# Patient Record
Sex: FEMALE
[De-identification: names, ages, dates, MRNs, and addresses within clinical notes are randomized per-mention and may not be internally consistent; named-entity substitution may affect disease eponyms.]

---

## 2021-07-12 PROBLEM — Z00.00 ENCOUNTER FOR PREVENTIVE HEALTH EXAMINATION: Status: ACTIVE | Noted: 2021-07-12

## 2021-07-17 ENCOUNTER — APPOINTMENT (OUTPATIENT)
Dept: CARDIOLOGY | Facility: CLINIC | Age: 56
End: 2021-07-17
Payer: COMMERCIAL

## 2021-07-17 VITALS
RESPIRATION RATE: 16 BRPM | WEIGHT: 162 LBS | DIASTOLIC BLOOD PRESSURE: 71 MMHG | SYSTOLIC BLOOD PRESSURE: 122 MMHG | HEART RATE: 65 BPM | BODY MASS INDEX: 27.66 KG/M2 | HEIGHT: 64 IN

## 2021-07-17 DIAGNOSIS — Z86.79 PERSONAL HISTORY OF OTHER DISEASES OF THE CIRCULATORY SYSTEM: ICD-10-CM

## 2021-07-17 DIAGNOSIS — Z82.49 FAMILY HISTORY OF ISCHEMIC HEART DISEASE AND OTHER DISEASES OF THE CIRCULATORY SYSTEM: ICD-10-CM

## 2021-07-17 DIAGNOSIS — Z86.39 PERSONAL HISTORY OF OTHER ENDOCRINE, NUTRITIONAL AND METABOLIC DISEASE: ICD-10-CM

## 2021-07-17 DIAGNOSIS — Z72.0 TOBACCO USE: ICD-10-CM

## 2021-07-17 DIAGNOSIS — Z82.3 FAMILY HISTORY OF STROKE: ICD-10-CM

## 2021-07-17 DIAGNOSIS — Z72.89 OTHER PROBLEMS RELATED TO LIFESTYLE: ICD-10-CM

## 2021-07-17 PROCEDURE — 93000 ELECTROCARDIOGRAM COMPLETE: CPT

## 2021-07-17 PROCEDURE — 99072 ADDL SUPL MATRL&STAF TM PHE: CPT

## 2021-07-17 PROCEDURE — 99204 OFFICE O/P NEW MOD 45 MIN: CPT

## 2021-07-23 ENCOUNTER — APPOINTMENT (OUTPATIENT)
Dept: CARDIOLOGY | Facility: CLINIC | Age: 56
End: 2021-07-23
Payer: COMMERCIAL

## 2021-07-23 PROCEDURE — 93306 TTE W/DOPPLER COMPLETE: CPT

## 2021-07-30 ENCOUNTER — APPOINTMENT (OUTPATIENT)
Dept: CARDIOLOGY | Facility: CLINIC | Age: 56
End: 2021-07-30
Payer: COMMERCIAL

## 2021-07-30 PROCEDURE — 93015 CV STRESS TEST SUPVJ I&R: CPT

## 2021-08-25 ENCOUNTER — APPOINTMENT (OUTPATIENT)
Dept: CARDIOLOGY | Facility: CLINIC | Age: 56
End: 2021-08-25
Payer: COMMERCIAL

## 2021-08-25 VITALS
HEIGHT: 64 IN | WEIGHT: 162 LBS | HEART RATE: 75 BPM | DIASTOLIC BLOOD PRESSURE: 70 MMHG | SYSTOLIC BLOOD PRESSURE: 124 MMHG | BODY MASS INDEX: 27.66 KG/M2 | RESPIRATION RATE: 16 BRPM

## 2021-08-25 PROCEDURE — 99214 OFFICE O/P EST MOD 30 MIN: CPT

## 2021-08-25 PROCEDURE — 93000 ELECTROCARDIOGRAM COMPLETE: CPT

## 2021-08-25 RX ORDER — LIOTHYRONINE SODIUM 5 UG/1
5 TABLET ORAL
Refills: 0 | Status: ACTIVE | COMMUNITY

## 2021-08-25 RX ORDER — LEVOTHYROXINE SODIUM 88 UG/1
88 TABLET ORAL DAILY
Refills: 0 | Status: ACTIVE | COMMUNITY

## 2021-08-25 NOTE — PHYSICAL EXAM
[Clear Lung Fields] : clear lung fields [Normal Bowel Sounds] : normal bowel sounds [No Edema] : no edema [No Rash] : no rash [Normal] : moves all extremities, no focal deficits, normal speech

## 2021-08-27 NOTE — REASON FOR VISIT
[FreeTextEntry1] : Mrs. Razo is a pleasant 55-year-old white female with a past medical history significant for rheumatic fever as a child as well as the serendipitous finding of a pericardial cyst while undergoing a CAT scan for an abdominal issue, who recently developed palpitations and presents for a follow up evaluation. \par \par

## 2021-08-27 NOTE — HISTORY OF PRESENT ILLNESS
[FreeTextEntry1] : Mrs. Razo continues to experience intermittent palpitations which last only a few seconds at a time and do not appear to be exertionally related, nor associated with chest pain, shortness of breath, lightheadedness, or syncope.  She underwent a non-invasive evaluation which included 30-day ambulatory event monitoring echocardiography as well as an exercise stress test.

## 2021-08-27 NOTE — ASSESSMENT
[FreeTextEntry1] : 1.  EKG today demonstrates normal sinus rhythm at 75 bpm.  Normal intervals.  No evidence of ischemia.  \par \par 2.  30-day ambulatory event monitoring which was worn for approximately 19 days revealed several short bursts of paroxysmal supraventricular tachycardia ranging between 190 and 208 bpm.  I suspect that this is what the patient has been feeling lately and will require further evaluation by way of an EP consultation and a probable EP study.  The patient states that she has no time right now to undergo a formal EP evaluation as her daughter is getting  in a few weeks.  I have advised her to begin Metoprolol Succinate 25 mg daily and avoid all caffeinated beverages.  She is to remain well-hydrated and avoid any unnecessary exertion until she can be evaluated in the near future.  \par \par 3.  Her echocardiogram revealed normal left ventricular chamber dimensions and wall motion with preserved ejection fraction estimated at 60-65%.  The left atrium and right-sided chambers revealed normal dimensions and function.  The aortic root revealed a normal diameter.  Trace mitral valvular regurgitation without evidence of associated prolapse was noted.  No other significant findings. \par \par 4.  Exercise stress testing was performed utilizing a Eduard protocol.  The patient exercised for approximately six minutes and obtained 91% of her predicted maximal heart rate.  At a peak heart rate of 148 bpm, there were no significant EKG changes noted to suggest ischemia.  The patient did not experience chest pain or significant arrhythmia with exercise. \par \par 5.  The above-noted evaluation demonstrates the presence of paroxysmal supraventricular tachycardia.  The patient’s heart is structurally sound and there is no evidence of coronary insufficiency.  She will follow up with EP in the near future.   \par

## 2021-09-30 ENCOUNTER — NON-APPOINTMENT (OUTPATIENT)
Age: 56
End: 2021-09-30

## 2021-09-30 ENCOUNTER — APPOINTMENT (OUTPATIENT)
Dept: ELECTROPHYSIOLOGY | Facility: CLINIC | Age: 56
End: 2021-09-30
Payer: COMMERCIAL

## 2021-09-30 VITALS
SYSTOLIC BLOOD PRESSURE: 138 MMHG | BODY MASS INDEX: 27.49 KG/M2 | WEIGHT: 161 LBS | DIASTOLIC BLOOD PRESSURE: 80 MMHG | HEART RATE: 62 BPM | HEIGHT: 64 IN | RESPIRATION RATE: 11 BRPM

## 2021-09-30 PROCEDURE — 99203 OFFICE O/P NEW LOW 30 MIN: CPT

## 2021-09-30 PROCEDURE — 93000 ELECTROCARDIOGRAM COMPLETE: CPT

## 2021-10-12 NOTE — DISCUSSION/SUMMARY
[FreeTextEntry1] : In summary, Ms. Holloway is a 56 year old female with symptomatic SVT likely atrial tachycardia (? pulmonary vein origin). Following initiation of medical therapy (metoprolol) she reports an improvement in her symptoms. I discussed the various therapeutic options in detail with the patient including continued medical therapy vs EP study/ablation. The patient wishes to continue medical therapy for now. If symptoms recur or worsen will proceed with EP study/ablation. \par

## 2021-10-12 NOTE — HISTORY OF PRESENT ILLNESS
[FreeTextEntry1] : Ms. Milady Holloway is a 56 year old female referred for arrhythmia management. She has a history of palpitations, rheumatic fever, and pericardial cyst (incidental finding on CT scan). \par \par She has been experiencing palpitations for the last few months. The symptoms usually last less than a minute. Sometimes she also experiences dizziness. She wore a Holter monitor which shows short runs of SVT, likely atrial tachycardia. She was started on metoprolol 25 mg daily. The patient states that her symptoms have improved since starting this medication. LVEF was normal per echo. \par \par

## 2021-10-12 NOTE — CARDIOLOGY SUMMARY
[de-identified] : 9/30/21: Normal sinus rhythm at 62 bpm. Normal intervals.  [de-identified] : 7/30/21: Negative exercise stress test for ischemia [de-identified] : 7/23/21: EF 60-65%, no significant valvular disease

## 2021-12-13 ENCOUNTER — RESULT CHARGE (OUTPATIENT)
Age: 56
End: 2021-12-13

## 2021-12-14 ENCOUNTER — APPOINTMENT (OUTPATIENT)
Dept: CARDIOLOGY | Facility: CLINIC | Age: 56
End: 2021-12-14
Payer: COMMERCIAL

## 2021-12-14 VITALS
SYSTOLIC BLOOD PRESSURE: 120 MMHG | WEIGHT: 163 LBS | DIASTOLIC BLOOD PRESSURE: 82 MMHG | HEART RATE: 84 BPM | RESPIRATION RATE: 14 BRPM | HEIGHT: 64 IN | BODY MASS INDEX: 27.83 KG/M2

## 2021-12-14 PROCEDURE — 93000 ELECTROCARDIOGRAM COMPLETE: CPT

## 2021-12-14 PROCEDURE — 99214 OFFICE O/P EST MOD 30 MIN: CPT

## 2021-12-19 NOTE — PHYSICAL EXAM
[Clear Lung Fields] : clear lung fields [Normal Bowel Sounds] : normal bowel sounds [No Edema] : no edema [No Rash] : no rash [Normal] : alert and oriented, normal memory

## 2021-12-22 NOTE — ASSESSMENT
[FreeTextEntry1] : 1.  EKG today reveals normal sinus rhythm at 84 bpm.  Normal intervals.  Low voltage.  No ischemic changes. \par \par 2.  Palpitations:  Clinically stable at this time on current medications.  I have advised her to continue beta blocker therapy and exercise as much as possible.  \par \par 3.  Review of recent bloodwork reveals a total cholesterol of 210, HDL 62, TC/HDL ratio 3.4, , triglycerides 55.  A low-fat / low-cholesterol diet is advised.  Patient will undergo follow up bloodwork in the next 3-6 months.  \par

## 2021-12-22 NOTE — HISTORY OF PRESENT ILLNESS
[FreeTextEntry1] :  From a cardiac standpoint, Mr. Razo admits to occasional palpitations which are not associated with exertion, chest pain, shortness of breath, lightheadedness, or syncope.  She was recently evaluated by EP who feels that her palpitations are likely secondary to atrial tachycardia which have clearly improved with the implementation of Metoprolol therapy.

## 2021-12-22 NOTE — REASON FOR VISIT
[FreeTextEntry1] : Mrs. Razo is a pleasant 56-year-old white female with a past medical history significant for rheumatic fever as a child as well as the presence of a pericardial cyst noted on CAT scan, and palpitations who presents for follow up evaluation. \par

## 2022-05-12 ENCOUNTER — INPATIENT (INPATIENT)
Facility: HOSPITAL | Age: 57
LOS: 1 days | Discharge: HOME | DRG: 809 | End: 2022-05-14
Attending: INTERNAL MEDICINE | Admitting: INTERNAL MEDICINE
Payer: COMMERCIAL

## 2022-05-12 VITALS — WEIGHT: 160.06 LBS

## 2022-05-12 DIAGNOSIS — D70.9 NEUTROPENIA, UNSPECIFIED: ICD-10-CM

## 2022-05-12 LAB
ALBUMIN SERPL ELPH-MCNC: 3.3 G/DL — SIGNIFICANT CHANGE UP (ref 3.3–5)
ALP SERPL-CCNC: 78 U/L — SIGNIFICANT CHANGE UP (ref 40–120)
ALT FLD-CCNC: 37 U/L — SIGNIFICANT CHANGE UP (ref 12–78)
ANION GAP SERPL CALC-SCNC: 6 MMOL/L — SIGNIFICANT CHANGE UP (ref 5–17)
APTT BLD: 27.1 SEC — LOW (ref 27.5–35.5)
AST SERPL-CCNC: 24 U/L — SIGNIFICANT CHANGE UP (ref 15–37)
BASOPHILS # BLD AUTO: 0 K/UL — SIGNIFICANT CHANGE UP (ref 0–0.2)
BASOPHILS NFR BLD AUTO: 0 % — SIGNIFICANT CHANGE UP (ref 0–2)
BILIRUB SERPL-MCNC: 0.3 MG/DL — SIGNIFICANT CHANGE UP (ref 0.2–1.2)
BUN SERPL-MCNC: 15 MG/DL — SIGNIFICANT CHANGE UP (ref 7–23)
CALCIUM SERPL-MCNC: 9.4 MG/DL — SIGNIFICANT CHANGE UP (ref 8.5–10.1)
CHLORIDE SERPL-SCNC: 104 MMOL/L — SIGNIFICANT CHANGE UP (ref 96–108)
CO2 SERPL-SCNC: 28 MMOL/L — SIGNIFICANT CHANGE UP (ref 22–31)
CREAT SERPL-MCNC: 0.92 MG/DL — SIGNIFICANT CHANGE UP (ref 0.5–1.3)
EGFR: 72 ML/MIN/1.73M2 — SIGNIFICANT CHANGE UP
EOSINOPHIL # BLD AUTO: 0 K/UL — SIGNIFICANT CHANGE UP (ref 0–0.5)
EOSINOPHIL NFR BLD AUTO: 0 % — SIGNIFICANT CHANGE UP (ref 0–6)
GLUCOSE SERPL-MCNC: 115 MG/DL — HIGH (ref 70–99)
HCT VFR BLD CALC: 31.6 % — LOW (ref 34.5–45)
HGB BLD-MCNC: 11 G/DL — LOW (ref 11.5–15.5)
INR BLD: 1.07 RATIO — SIGNIFICANT CHANGE UP (ref 0.88–1.16)
LACTATE SERPL-SCNC: 0.8 MMOL/L — SIGNIFICANT CHANGE UP (ref 0.7–2)
LYMPHOCYTES # BLD AUTO: 1.26 K/UL — SIGNIFICANT CHANGE UP (ref 1–3.3)
LYMPHOCYTES # BLD AUTO: 69 % — HIGH (ref 13–44)
MCHC RBC-ENTMCNC: 29.2 PG — SIGNIFICANT CHANGE UP (ref 27–34)
MCHC RBC-ENTMCNC: 34.8 GM/DL — SIGNIFICANT CHANGE UP (ref 32–36)
MCV RBC AUTO: 83.8 FL — SIGNIFICANT CHANGE UP (ref 80–100)
MONOCYTES # BLD AUTO: 0.29 K/UL — SIGNIFICANT CHANGE UP (ref 0–0.9)
MONOCYTES NFR BLD AUTO: 16 % — HIGH (ref 2–14)
NEUTROPHILS # BLD AUTO: 0.22 K/UL — LOW (ref 1.8–7.4)
NEUTROPHILS NFR BLD AUTO: 10 % — LOW (ref 43–77)
NRBC # BLD: SIGNIFICANT CHANGE UP /100 WBCS (ref 0–0)
PLATELET # BLD AUTO: 279 K/UL — SIGNIFICANT CHANGE UP (ref 150–400)
POTASSIUM SERPL-MCNC: 3.2 MMOL/L — LOW (ref 3.5–5.3)
POTASSIUM SERPL-SCNC: 3.2 MMOL/L — LOW (ref 3.5–5.3)
PROT SERPL-MCNC: 7 GM/DL — SIGNIFICANT CHANGE UP (ref 6–8.3)
PROTHROM AB SERPL-ACNC: 12.4 SEC — SIGNIFICANT CHANGE UP (ref 10.5–13.4)
RBC # BLD: 3.77 M/UL — LOW (ref 3.8–5.2)
RBC # FLD: 12 % — SIGNIFICANT CHANGE UP (ref 10.3–14.5)
SODIUM SERPL-SCNC: 138 MMOL/L — SIGNIFICANT CHANGE UP (ref 135–145)
WBC # BLD: 1.82 K/UL — LOW (ref 3.8–10.5)
WBC # FLD AUTO: 1.82 K/UL — LOW (ref 3.8–10.5)

## 2022-05-12 PROCEDURE — 85025 COMPLETE CBC W/AUTO DIFF WBC: CPT

## 2022-05-12 PROCEDURE — 87040 BLOOD CULTURE FOR BACTERIA: CPT

## 2022-05-12 PROCEDURE — 83036 HEMOGLOBIN GLYCOSYLATED A1C: CPT

## 2022-05-12 PROCEDURE — 85730 THROMBOPLASTIN TIME PARTIAL: CPT

## 2022-05-12 PROCEDURE — 85610 PROTHROMBIN TIME: CPT

## 2022-05-12 PROCEDURE — 99285 EMERGENCY DEPT VISIT HI MDM: CPT

## 2022-05-12 PROCEDURE — 0225U NFCT DS DNA&RNA 21 SARSCOV2: CPT

## 2022-05-12 PROCEDURE — 87086 URINE CULTURE/COLONY COUNT: CPT

## 2022-05-12 PROCEDURE — 85027 COMPLETE CBC AUTOMATED: CPT

## 2022-05-12 PROCEDURE — 81001 URINALYSIS AUTO W/SCOPE: CPT

## 2022-05-12 PROCEDURE — G0378: CPT

## 2022-05-12 PROCEDURE — 36415 COLL VENOUS BLD VENIPUNCTURE: CPT

## 2022-05-12 PROCEDURE — 71045 X-RAY EXAM CHEST 1 VIEW: CPT | Mod: 26

## 2022-05-12 PROCEDURE — 93010 ELECTROCARDIOGRAM REPORT: CPT

## 2022-05-12 PROCEDURE — 80053 COMPREHEN METABOLIC PANEL: CPT

## 2022-05-12 PROCEDURE — 83605 ASSAY OF LACTIC ACID: CPT

## 2022-05-12 PROCEDURE — 80048 BASIC METABOLIC PNL TOTAL CA: CPT

## 2022-05-12 PROCEDURE — 86803 HEPATITIS C AB TEST: CPT

## 2022-05-12 RX ORDER — PIPERACILLIN AND TAZOBACTAM 4; .5 G/20ML; G/20ML
3.38 INJECTION, POWDER, LYOPHILIZED, FOR SOLUTION INTRAVENOUS ONCE
Refills: 0 | Status: COMPLETED | OUTPATIENT
Start: 2022-05-12 | End: 2022-05-12

## 2022-05-12 RX ORDER — BENZOCAINE AND MENTHOL 5; 1 G/100ML; G/100ML
1 LIQUID ORAL ONCE
Refills: 0 | Status: COMPLETED | OUTPATIENT
Start: 2022-05-12 | End: 2022-05-12

## 2022-05-12 RX ORDER — ACETAMINOPHEN 500 MG
650 TABLET ORAL ONCE
Refills: 0 | Status: COMPLETED | OUTPATIENT
Start: 2022-05-12 | End: 2022-05-12

## 2022-05-12 RX ADMIN — PIPERACILLIN AND TAZOBACTAM 200 GRAM(S): 4; .5 INJECTION, POWDER, LYOPHILIZED, FOR SOLUTION INTRAVENOUS at 21:53

## 2022-05-12 RX ADMIN — BENZOCAINE AND MENTHOL 1 LOZENGE: 5; 1 LIQUID ORAL at 21:13

## 2022-05-12 RX ADMIN — Medication 650 MILLIGRAM(S): at 21:13

## 2022-05-12 NOTE — ED PROVIDER NOTE - CARDIAC, MLM
The current ASCCP guidelines were reviewed  Patient's last pap was 12/9/20 - WNL and therefore, a pap with HPV cotesting is not indicated at this time  I emphasized the importance of an annual pelvic and breast exam  Patient ok to defer pap today 
Normal rate, regular rhythm.  Heart sounds S1, S2.  No murmurs, rubs or gallops.

## 2022-05-12 NOTE — ED PROVIDER NOTE - ENMT, MLM
Airway patent, Nasal mucosa clear. Mouth with normal mucosa. Throat has no vesicles, no oropharyngeal exudates and uvula is midline. Airway patent, Nasal mucosa clear. Mouth with normal mucosa. Throat has no vesicles, no oropharyngeal exudates and uvula is midline. +Ulcer of the palate and erythematous pharynx

## 2022-05-12 NOTE — ED PROVIDER NOTE - OBJECTIVE STATEMENT
56F with PMHx of thymoma presents for fevers since this morning. Pt had 1st chemotherapy on 4/28, felt fine afterwards, yesterday felt some generalized weakness and sore throat, noted temp of 101.4, pt had labs done, found to be neutropenic ANC 0, had IV zosyn at [facility] and was referred to the ED for further care and management. 57 y/o female with a PMHx of thymoma presents to the ED for fever x this morning. Pt followed at [facility]. Pt reports she had sore throat x2 days ago. Pt developed fatigue and fever of 100.3 during the evening. Pt was seen today at [facility], had lab work done, and was found have 0 neutrophils. pt was given Tylenol and Zosyn for fever of 101. Pt sent in for admission and further care. Pt recently stated on Chemotherapy on 4/28. 55 y/o female with a PMHx of thymoma presents to the ED for fever x this morning. Pt followed at [facility]. Pt reports she had sore throat x2 days ago. Pt developed fatigue and fever of 100.3 during the evening. Pt was seen today at [facility], had lab work done, and was found have 0 neutrophils. pt was given Tylenol, ?neulasta and Zosyn for fever of 101. Pt sent in for admission and further care. Pt recently stated on Chemotherapy on 4/28. Nonsmoker nondrinker no drugs

## 2022-05-12 NOTE — ED STATDOCS - PROGRESS NOTE DETAILS
Cathie BURGOS for ED attending Dr. Yañez: 56F with PMHx of thymoma presents for fevers since this morning. Pt had 1st chemotherapy on 4/28, felt fine afterwards, yesterday felt some generalized weakness and sore throat, noted temp of 101.4, pt had labs done, found to be neutropenic, had IV zosyn at [facility] and sent here for admission. Will send pt to main ED for further evaluation. Cathie BURGOS for ED attending Dr. Yañez: 56F with PMHx of thymoma presents for fevers since this morning. Pt had 1st chemotherapy on 4/28, felt fine afterwards, yesterday felt some generalized weakness and sore throat, noted temp of 101.4, pt had labs done, found to be neutropenic ANC 0, had IV zosyn at [facility] and sent here for admission. Will send pt to main ED for further evaluation.

## 2022-05-12 NOTE — ED STATDOCS - NS_ ATTENDINGSCRIBEDETAILS _ED_A_ED_FT
I, Naif Yañez MD,  performed the initial face to face bedside interview with this patient regarding history of present illness, review of symptoms and relevant past medical, social and family history.  I completed an independent physical examination.  I was the initial provider who evaluated this patient.   I personally saw the patient and performed a substantive portion of the visit including all aspects of the medical decision making.  The history, relevant review of systems, past medical and surgical history, medical decision making, and physical examination was documented by the scribe in my presence and I attest to the accuracy of the documentation.

## 2022-05-12 NOTE — CONSULT NOTE ADULT - ASSESSMENT
This is a 56-year-old female history of recently diagnosed thymoma type IIb presently on CAP (cyclophosphamide, Adriamycin, cisplatin) now after her first treatment admitted for severe neutropenia and fever.  The patient's ANC as an outpatient was 0.0.  She received one-time dose of filgrastim in the office prior to on route to the hospital.  With regards to the patient's localizing symptoms that does not appear to be any active signs of infection would recommend broad coverage/broad-spectrum antibiotics in the interim will send off cultures.  Wait for cultures to return back if positive may require long course of antibiotics.  The patient did note that she has been having some mild dysphagia.  Would recommend starting Magic mouthwash  Outside that continue to maintain maintain neutropenic precautions and will continue filgrastim daily.  Bud Doran MD   Hematology/ Oncology   [facility]   [facility]   C: [phone]  [address], [place]  P:[phone]  F:[phone]  and [phone]  [address] [place], [place]   P:[phone]  F:[phone]

## 2022-05-12 NOTE — ED ADULT NURSE REASSESSMENT NOTE - NS ED NURSE REASSESS COMMENT FT1
1900- report received from APOLINAR Rodriges. pt resting comfortably in stretcher. family member at the bedside. No further complains at this time.

## 2022-05-12 NOTE — ED ADULT NURSE NOTE - OBJECTIVE STATEMENT
Pt reports current treatment at [facility] for recent dx of ca. Pt reprots she was told she was neutropenic and developed a low fever. Pt reports that she was told to come to ED for further eval with likely admission.

## 2022-05-12 NOTE — ED PROVIDER NOTE - CLINICAL SUMMARY MEDICAL DECISION MAKING FREE TEXT BOX
Pt with hx of thymoma on chemo here after developing fever and sore throat, pt found to be neutropenic.  IV antibiotics and and Neupogen given at [facility]. Plan- antibiotics, admission.

## 2022-05-13 DIAGNOSIS — Z98.890 OTHER SPECIFIED POSTPROCEDURAL STATES: Chronic | ICD-10-CM

## 2022-05-13 LAB
A1C WITH ESTIMATED AVERAGE GLUCOSE RESULT: 5.5 % — SIGNIFICANT CHANGE UP (ref 4–5.6)
ALBUMIN SERPL ELPH-MCNC: 3 G/DL — LOW (ref 3.3–5)
ALP SERPL-CCNC: 79 U/L — SIGNIFICANT CHANGE UP (ref 40–120)
ALT FLD-CCNC: 38 U/L — SIGNIFICANT CHANGE UP (ref 12–78)
ANION GAP SERPL CALC-SCNC: 6 MMOL/L — SIGNIFICANT CHANGE UP (ref 5–17)
APPEARANCE UR: CLEAR — SIGNIFICANT CHANGE UP
APTT BLD: 27.1 SEC — LOW (ref 27.5–35.5)
AST SERPL-CCNC: 26 U/L — SIGNIFICANT CHANGE UP (ref 15–37)
BASOPHILS # BLD AUTO: 0 K/UL — SIGNIFICANT CHANGE UP (ref 0–0.2)
BASOPHILS NFR BLD AUTO: 0 % — SIGNIFICANT CHANGE UP (ref 0–2)
BILIRUB SERPL-MCNC: 0.4 MG/DL — SIGNIFICANT CHANGE UP (ref 0.2–1.2)
BILIRUB UR-MCNC: NEGATIVE — SIGNIFICANT CHANGE UP
BUN SERPL-MCNC: 13 MG/DL — SIGNIFICANT CHANGE UP (ref 7–23)
CALCIUM SERPL-MCNC: 9.5 MG/DL — SIGNIFICANT CHANGE UP (ref 8.5–10.1)
CHLORIDE SERPL-SCNC: 106 MMOL/L — SIGNIFICANT CHANGE UP (ref 96–108)
CO2 SERPL-SCNC: 28 MMOL/L — SIGNIFICANT CHANGE UP (ref 22–31)
COLOR SPEC: YELLOW — SIGNIFICANT CHANGE UP
CREAT SERPL-MCNC: 0.97 MG/DL — SIGNIFICANT CHANGE UP (ref 0.5–1.3)
DIFF PNL FLD: NEGATIVE — SIGNIFICANT CHANGE UP
EGFR: 68 ML/MIN/1.73M2 — SIGNIFICANT CHANGE UP
EOSINOPHIL # BLD AUTO: 0 K/UL — SIGNIFICANT CHANGE UP (ref 0–0.5)
EOSINOPHIL NFR BLD AUTO: 0 % — SIGNIFICANT CHANGE UP (ref 0–6)
ESTIMATED AVERAGE GLUCOSE: 111 MG/DL — SIGNIFICANT CHANGE UP (ref 68–114)
GLUCOSE SERPL-MCNC: 96 MG/DL — SIGNIFICANT CHANGE UP (ref 70–99)
GLUCOSE UR QL: NEGATIVE — SIGNIFICANT CHANGE UP
HCT VFR BLD CALC: 32.9 % — LOW (ref 34.5–45)
HCV AB S/CO SERPL IA: 0.13 S/CO — SIGNIFICANT CHANGE UP (ref 0–0.99)
HCV AB SERPL-IMP: SIGNIFICANT CHANGE UP
HGB BLD-MCNC: 11.2 G/DL — LOW (ref 11.5–15.5)
INR BLD: 1.08 RATIO — SIGNIFICANT CHANGE UP (ref 0.88–1.16)
KETONES UR-MCNC: NEGATIVE — SIGNIFICANT CHANGE UP
LEUKOCYTE ESTERASE UR-ACNC: NEGATIVE — SIGNIFICANT CHANGE UP
LYMPHOCYTES # BLD AUTO: 1.38 K/UL — SIGNIFICANT CHANGE UP (ref 1–3.3)
LYMPHOCYTES # BLD AUTO: 42 % — SIGNIFICANT CHANGE UP (ref 13–44)
MCHC RBC-ENTMCNC: 29.6 PG — SIGNIFICANT CHANGE UP (ref 27–34)
MCHC RBC-ENTMCNC: 34 GM/DL — SIGNIFICANT CHANGE UP (ref 32–36)
MCV RBC AUTO: 86.8 FL — SIGNIFICANT CHANGE UP (ref 80–100)
MONOCYTES # BLD AUTO: 0.79 K/UL — SIGNIFICANT CHANGE UP (ref 0–0.9)
MONOCYTES NFR BLD AUTO: 24 % — HIGH (ref 2–14)
NEUTROPHILS # BLD AUTO: 1.02 K/UL — LOW (ref 1.8–7.4)
NEUTROPHILS NFR BLD AUTO: 26 % — LOW (ref 43–77)
NITRITE UR-MCNC: NEGATIVE — SIGNIFICANT CHANGE UP
NRBC # BLD: SIGNIFICANT CHANGE UP /100 WBCS (ref 0–0)
PH UR: 5 — SIGNIFICANT CHANGE UP (ref 5–8)
PLATELET # BLD AUTO: 291 K/UL — SIGNIFICANT CHANGE UP (ref 150–400)
POTASSIUM SERPL-MCNC: 3.4 MMOL/L — LOW (ref 3.5–5.3)
POTASSIUM SERPL-SCNC: 3.4 MMOL/L — LOW (ref 3.5–5.3)
PROT SERPL-MCNC: 6.9 GM/DL — SIGNIFICANT CHANGE UP (ref 6–8.3)
PROT UR-MCNC: 15
PROTHROM AB SERPL-ACNC: 12.5 SEC — SIGNIFICANT CHANGE UP (ref 10.5–13.4)
RBC # BLD: 3.79 M/UL — LOW (ref 3.8–5.2)
RBC # FLD: 12.2 % — SIGNIFICANT CHANGE UP (ref 10.3–14.5)
SODIUM SERPL-SCNC: 140 MMOL/L — SIGNIFICANT CHANGE UP (ref 135–145)
SP GR SPEC: 1.01 — SIGNIFICANT CHANGE UP (ref 1.01–1.02)
UROBILINOGEN FLD QL: NEGATIVE — SIGNIFICANT CHANGE UP
WBC # BLD: 3.28 K/UL — LOW (ref 3.8–10.5)
WBC # FLD AUTO: 3.28 K/UL — LOW (ref 3.8–10.5)

## 2022-05-13 PROCEDURE — 12345: CPT | Mod: NC

## 2022-05-13 PROCEDURE — 99497 ADVNCD CARE PLAN 30 MIN: CPT | Mod: 25

## 2022-05-13 PROCEDURE — 99223 1ST HOSP IP/OBS HIGH 75: CPT

## 2022-05-13 RX ORDER — POTASSIUM CHLORIDE 20 MEQ
20 PACKET (EA) ORAL ONCE
Refills: 0 | Status: COMPLETED | OUTPATIENT
Start: 2022-05-13 | End: 2022-05-13

## 2022-05-13 RX ORDER — LANOLIN ALCOHOL/MO/W.PET/CERES
3 CREAM (GRAM) TOPICAL AT BEDTIME
Refills: 0 | Status: DISCONTINUED | OUTPATIENT
Start: 2022-05-13 | End: 2022-05-14

## 2022-05-13 RX ORDER — ONDANSETRON 8 MG/1
4 TABLET, FILM COATED ORAL EVERY 8 HOURS
Refills: 0 | Status: DISCONTINUED | OUTPATIENT
Start: 2022-05-13 | End: 2022-05-14

## 2022-05-13 RX ORDER — OLANZAPINE 15 MG/1
1 TABLET, FILM COATED ORAL
Qty: 0 | Refills: 0 | DISCHARGE

## 2022-05-13 RX ORDER — POTASSIUM CHLORIDE 20 MEQ
40 PACKET (EA) ORAL ONCE
Refills: 0 | Status: COMPLETED | OUTPATIENT
Start: 2022-05-13 | End: 2022-05-13

## 2022-05-13 RX ORDER — ACETAMINOPHEN 500 MG
650 TABLET ORAL EVERY 6 HOURS
Refills: 0 | Status: DISCONTINUED | OUTPATIENT
Start: 2022-05-13 | End: 2022-05-14

## 2022-05-13 RX ORDER — DIPHENHYDRAMINE HYDROCHLORIDE AND LIDOCAINE HYDROCHLORIDE AND ALUMINUM HYDROXIDE AND MAGNESIUM HYDRO
5 KIT
Refills: 0 | Status: DISCONTINUED | OUTPATIENT
Start: 2022-05-13 | End: 2022-05-14

## 2022-05-13 RX ORDER — LEVOTHYROXINE SODIUM 125 MCG
88 TABLET ORAL DAILY
Refills: 0 | Status: DISCONTINUED | OUTPATIENT
Start: 2022-05-13 | End: 2022-05-14

## 2022-05-13 RX ORDER — METOPROLOL TARTRATE 50 MG
25 TABLET ORAL DAILY
Refills: 0 | Status: DISCONTINUED | OUTPATIENT
Start: 2022-05-13 | End: 2022-05-14

## 2022-05-13 RX ORDER — METOPROLOL TARTRATE 50 MG
1 TABLET ORAL
Qty: 0 | Refills: 0 | DISCHARGE

## 2022-05-13 RX ORDER — PIPERACILLIN AND TAZOBACTAM 4; .5 G/20ML; G/20ML
3.38 INJECTION, POWDER, LYOPHILIZED, FOR SOLUTION INTRAVENOUS EVERY 8 HOURS
Refills: 0 | Status: DISCONTINUED | OUTPATIENT
Start: 2022-05-13 | End: 2022-05-14

## 2022-05-13 RX ORDER — ALPRAZOLAM 0.25 MG
1 TABLET ORAL
Qty: 0 | Refills: 0 | DISCHARGE

## 2022-05-13 RX ORDER — LEVOTHYROXINE SODIUM 125 MCG
1 TABLET ORAL
Qty: 0 | Refills: 0 | DISCHARGE

## 2022-05-13 RX ORDER — ZINC SULFATE TAB 220 MG (50 MG ZINC EQUIVALENT) 220 (50 ZN) MG
1 TAB ORAL
Qty: 0 | Refills: 0 | DISCHARGE

## 2022-05-13 RX ORDER — SODIUM CHLORIDE 9 MG/ML
1000 INJECTION INTRAMUSCULAR; INTRAVENOUS; SUBCUTANEOUS
Refills: 0 | Status: COMPLETED | OUTPATIENT
Start: 2022-05-13 | End: 2022-05-13

## 2022-05-13 RX ORDER — ESCITALOPRAM OXALATE 10 MG/1
5 TABLET, FILM COATED ORAL DAILY
Refills: 0 | Status: DISCONTINUED | OUTPATIENT
Start: 2022-05-13 | End: 2022-05-14

## 2022-05-13 RX ORDER — ESCITALOPRAM OXALATE 10 MG/1
1 TABLET, FILM COATED ORAL
Qty: 0 | Refills: 0 | DISCHARGE

## 2022-05-13 RX ORDER — CHOLECALCIFEROL (VITAMIN D3) 125 MCG
5000 CAPSULE ORAL DAILY
Refills: 0 | Status: DISCONTINUED | OUTPATIENT
Start: 2022-05-13 | End: 2022-05-14

## 2022-05-13 RX ORDER — CHOLECALCIFEROL (VITAMIN D3) 125 MCG
1 CAPSULE ORAL
Qty: 0 | Refills: 0 | DISCHARGE

## 2022-05-13 RX ORDER — FILGRASTIM 480MCG/1.6
480 VIAL (ML) INJECTION DAILY
Refills: 0 | Status: DISCONTINUED | OUTPATIENT
Start: 2022-05-13 | End: 2022-05-14

## 2022-05-13 RX ORDER — ONDANSETRON 8 MG/1
1 TABLET, FILM COATED ORAL
Qty: 0 | Refills: 0 | DISCHARGE

## 2022-05-13 RX ORDER — ALPRAZOLAM 0.25 MG
0.25 TABLET ORAL EVERY 8 HOURS
Refills: 0 | Status: DISCONTINUED | OUTPATIENT
Start: 2022-05-13 | End: 2022-05-14

## 2022-05-13 RX ORDER — POTASSIUM CHLORIDE 20 MEQ
10 PACKET (EA) ORAL ONCE
Refills: 0 | Status: COMPLETED | OUTPATIENT
Start: 2022-05-13 | End: 2022-05-13

## 2022-05-13 RX ADMIN — Medication 650 MILLIGRAM(S): at 23:34

## 2022-05-13 RX ADMIN — DIPHENHYDRAMINE HYDROCHLORIDE AND LIDOCAINE HYDROCHLORIDE AND ALUMINUM HYDROXIDE AND MAGNESIUM HYDRO 5 MILLILITER(S): KIT at 18:36

## 2022-05-13 RX ADMIN — SODIUM CHLORIDE 100 MILLILITER(S): 9 INJECTION INTRAMUSCULAR; INTRAVENOUS; SUBCUTANEOUS at 06:40

## 2022-05-13 RX ADMIN — ESCITALOPRAM OXALATE 5 MILLIGRAM(S): 10 TABLET, FILM COATED ORAL at 12:33

## 2022-05-13 RX ADMIN — Medication 20 MILLIEQUIVALENT(S): at 15:14

## 2022-05-13 RX ADMIN — PIPERACILLIN AND TAZOBACTAM 25 GRAM(S): 4; .5 INJECTION, POWDER, LYOPHILIZED, FOR SOLUTION INTRAVENOUS at 15:14

## 2022-05-13 RX ADMIN — DIPHENHYDRAMINE HYDROCHLORIDE AND LIDOCAINE HYDROCHLORIDE AND ALUMINUM HYDROXIDE AND MAGNESIUM HYDRO 5 MILLILITER(S): KIT at 06:40

## 2022-05-13 RX ADMIN — PIPERACILLIN AND TAZOBACTAM 25 GRAM(S): 4; .5 INJECTION, POWDER, LYOPHILIZED, FOR SOLUTION INTRAVENOUS at 23:04

## 2022-05-13 RX ADMIN — PIPERACILLIN AND TAZOBACTAM 25 GRAM(S): 4; .5 INJECTION, POWDER, LYOPHILIZED, FOR SOLUTION INTRAVENOUS at 06:40

## 2022-05-13 RX ADMIN — Medication 88 MICROGRAM(S): at 06:39

## 2022-05-13 RX ADMIN — Medication 5000 UNIT(S): at 10:52

## 2022-05-13 RX ADMIN — Medication 480 MICROGRAM(S): at 12:33

## 2022-05-13 RX ADMIN — Medication 25 MILLIGRAM(S): at 10:52

## 2022-05-13 RX ADMIN — DIPHENHYDRAMINE HYDROCHLORIDE AND LIDOCAINE HYDROCHLORIDE AND ALUMINUM HYDROXIDE AND MAGNESIUM HYDRO 5 MILLILITER(S): KIT at 23:04

## 2022-05-13 RX ADMIN — DIPHENHYDRAMINE HYDROCHLORIDE AND LIDOCAINE HYDROCHLORIDE AND ALUMINUM HYDROXIDE AND MAGNESIUM HYDRO 5 MILLILITER(S): KIT at 12:33

## 2022-05-13 NOTE — PROGRESS NOTE ADULT - SUBJECTIVE AND OBJECTIVE BOX
INTERVAL HPI/OVERNIGHT EVENTS:  Patient S&E at bedside. No o/n events,     PAST MEDICAL & SURGICAL HISTORY:  HTN (hypertension)      Pericardial cyst      History of palpitations      Anxiety      Hashimoto&#x27;s thyroiditis      H/O: rheumatic fever      Mitral valve prolapse      S/P ovarian cystectomy          FAMILY HISTORY:  FH: heart attack (Father)    Family history of benign tumor of cerebral meninges (Mother)        VITAL SIGNS:  T(F): 98.1 (22 @ 03:07)  HR: 86 (22 @ 03:07)  BP: 125/59 (22 @ 03:07)  RR: 16 (22 @ 03:07)  SpO2: 92% (22 @ 03:07)  Wt(kg): --    PHYSICAL EXAM:    Constitutional: NAD  Eyes: EOMI, sclera non-icteric  Neck: supple, no masses, no JVD  Respiratory: CTA b/l, good air entry b/l  Cardiovascular: RRR, no M/R/G  Gastrointestinal: soft, NTND, no masses palpable, + BS, no hepatosplenomegaly  Extremities: no c/c/e  Neurological: AAOx3      MEDICATIONS  (STANDING):  cholecalciferol 5000 Unit(s) Oral daily  escitalopram 5 milliGRAM(s) Oral daily  filgrastim-sndz (ZARXIO) Injectable 480 MICROGram(s) SubCutaneous daily  FIRST- Mouthwash  BLM 5 milliLiter(s) Swish and Swallow four times a day  levothyroxine 88 MICROGram(s) Oral daily  metoprolol succinate ER 25 milliGRAM(s) Oral daily  piperacillin/tazobactam IVPB.. 3.375 Gram(s) IV Intermittent every 8 hours    MEDICATIONS  (PRN):  acetaminophen     Tablet .. 650 milliGRAM(s) Oral every 6 hours PRN Temp greater or equal to 38C (100.4F), Mild Pain (1 - 3)  ALPRAZolam 0.25 milliGRAM(s) Oral every 8 hours PRN anxiety  aluminum hydroxide/magnesium hydroxide/simethicone Suspension 30 milliLiter(s) Oral every 4 hours PRN Dyspepsia  melatonin 3 milliGRAM(s) Oral at bedtime PRN Insomnia  ondansetron Injectable 4 milliGRAM(s) IV Push every 8 hours PRN Nausea and/or Vomiting      Allergies    No Known Allergies    Intolerances        LABS:                        11.0   1.82  )-----------( 279      ( 12 May 2022 21:49 )             31.6     05-    138  |  104  |  15  ----------------------------<  115<H>  3.2<L>   |  28  |  0.92    Ca    9.4      12 May 2022 21:49    TPro  7.0  /  Alb  3.3  /  TBili  0.3  /  DBili  x   /  AST  24  /  ALT  37  /  AlkPhos  78  05-12    PT/INR - ( 12 May 2022 21:49 )   PT: 12.4 sec;   INR: 1.07 ratio         PTT - ( 12 May 2022 21:49 )  PTT:27.1 sec  Urinalysis Basic - ( 13 May 2022 02:49 )    Color: Yellow / Appearance: Clear / S.010 / pH: x  Gluc: x / Ketone: Negative  / Bili: Negative / Urobili: Negative   Blood: x / Protein: 15 / Nitrite: Negative   Leuk Esterase: Negative / RBC: Negative /HPF / WBC Negative   Sq Epi: x / Non Sq Epi: Occasional / Bacteria: Negative        RADIOLOGY & ADDITIONAL TESTS:  Studies reviewed.   INTERVAL HPI/OVERNIGHT EVENTS:  Patient S&E at bedside. No o/n events, vss. Labs reviewed, Anc improving 1020 today- WBC 3.28. afebrile.    PAST MEDICAL & SURGICAL HISTORY:  HTN (hypertension)      Pericardial cyst      History of palpitations      Anxiety      Hashimoto&#x27;s thyroiditis      H/O: rheumatic fever      Mitral valve prolapse      S/P ovarian cystectomy          FAMILY HISTORY:  FH: heart attack (Father)    Family history of benign tumor of cerebral meninges (Mother)        VITAL SIGNS:  T(F): 98.1 (22 @ 03:07)  HR: 86 (22 @ 03:07)  BP: 125/59 (22 @ 03:07)  RR: 16 (22 @ 03:07)  SpO2: 92% (22 @ 03:07)  Wt(kg): --    PHYSICAL EXAM:    Constitutional: NAD  Eyes: EOMI, sclera non-icteric  Neck: supple, no masses, no JVD  Respiratory: CTA b/l, good air entry b/l  Cardiovascular: RRR, no M/R/G  Gastrointestinal: soft, NTND, no masses palpable, + BS, no hepatosplenomegaly  Extremities: no c/c/e  Neurological: AAOx3      MEDICATIONS  (STANDING):  cholecalciferol 5000 Unit(s) Oral daily  escitalopram 5 milliGRAM(s) Oral daily  filgrastim-sndz (ZARXIO) Injectable 480 MICROGram(s) SubCutaneous daily  FIRST- Mouthwash  BLM 5 milliLiter(s) Swish and Swallow four times a day  levothyroxine 88 MICROGram(s) Oral daily  metoprolol succinate ER 25 milliGRAM(s) Oral daily  piperacillin/tazobactam IVPB.. 3.375 Gram(s) IV Intermittent every 8 hours    MEDICATIONS  (PRN):  acetaminophen     Tablet .. 650 milliGRAM(s) Oral every 6 hours PRN Temp greater or equal to 38C (100.4F), Mild Pain (1 - 3)  ALPRAZolam 0.25 milliGRAM(s) Oral every 8 hours PRN anxiety  aluminum hydroxide/magnesium hydroxide/simethicone Suspension 30 milliLiter(s) Oral every 4 hours PRN Dyspepsia  melatonin 3 milliGRAM(s) Oral at bedtime PRN Insomnia  ondansetron Injectable 4 milliGRAM(s) IV Push every 8 hours PRN Nausea and/or Vomiting      Allergies    No Known Allergies    Intolerances        LABS:                        11.0   1.82  )-----------( 279      ( 12 May 2022 21:49 )             31.6         138  |  104  |  15  ----------------------------<  115<H>  3.2<L>   |  28  |  0.92    Ca    9.4      12 May 2022 21:49    TPro  7.0  /  Alb  3.3  /  TBili  0.3  /  DBili  x   /  AST  24  /  ALT  37  /  AlkPhos  78  12    PT/INR - ( 12 May 2022 21:49 )   PT: 12.4 sec;   INR: 1.07 ratio         PTT - ( 12 May 2022 21:49 )  PTT:27.1 sec  Urinalysis Basic - ( 13 May 2022 02:49 )    Color: Yellow / Appearance: Clear / S.010 / pH: x  Gluc: x / Ketone: Negative  / Bili: Negative / Urobili: Negative   Blood: x / Protein: 15 / Nitrite: Negative   Leuk Esterase: Negative / RBC: Negative /HPF / WBC Negative   Sq Epi: x / Non Sq Epi: Occasional / Bacteria: Negative        RADIOLOGY & ADDITIONAL TESTS:  Studies reviewed.

## 2022-05-13 NOTE — H&P ADULT - ASSESSMENT
55 yo female with a pmh/o HTN, pericardial cyst, palpitations, Hashimoto's thyroiditis, Rheumatic fever, MVP, type IIB thymoma, who had first cycle of CAP chemotherapy on 4/28, who presents to ED after receiving zosyn, Neupogen and Tylenol at [facility] for fever to 101 and neutropenia to 0. Admitted due to:    #Neutropenic fever  #Pancytopenia  #Type IIB Thymoma  Admit to med/surg  Neutropenic precautions  Hematology/oncology consult appreciated, recs implemented  ID consulted  C/w Zosyn  C/w filgrastim  IVF for gentle hydration x 1 dose  F/u blood and urine cultures  Ambulate as tolerated  Tylenol prn  Zofran prn  f/u AM cbc, coags, cmp  SCD for dvt ppx    #Sore throat  #Dysphagia  No evidence of thrush on physical exam  +diffuse erythema of oropharynx  mild dysphagia due to pain  C/w magic mouthwash     #Hypokalemia  Klor 40meq x 1 dose   f/u on serum chem in AM    #Hyperglycemia  in setting of decreased po intake  A1c ordered  monitor glucose on serum chem  pending result and trend start carb rest/aiss/accuchecks    #HTN  c/w metoprolol    #Anxiety  c/w lexapro  c/w xanax prn

## 2022-05-13 NOTE — PROGRESS NOTE ADULT - ASSESSMENT
This is a 56-year-old female history of recently diagnosed thymoma type IIb presently on CAP (cyclophosphamide, Adriamycin, cisplatin) now after her first treatment admitted for severe neutropenia and fever.    # neutropenic fever  - ANC as an outpatient was 0.0.  5/12 ws 0.22  - received one-time dose of filgrastim in the office prior to on route to the hospital.    - does not appear to be any active signs of infection would recommend broad coverage/broad-spectrum antibiotics in the interim will send off cultures.    - Wait for cultures to return back if positive may require long course of antibiotics.  - The patient did note that she has been having some mild dysphagia.  Would recommend starting Magic mouthwash  - c/w neutropenic precautions   - continue filgrastim 480 mcg daily.      Dr. Reynold Maldondao  cell: [phone]  Weekends and nights please call [phone] for MD on call  [facility]  Hematology/Oncology  This is a 56-year-old female history of recently diagnosed thymoma type IIb presently on CAP (cyclophosphamide, Adriamycin, cisplatin) now after her first treatment admitted for severe neutropenia and fever.    # neutropenic fever  - ANC as an outpatient was 0.0.  5/12 ws 0.22  - received one-time dose of filgrastim in the office prior to on route to the hospital.    - seen by ID- on zosyn  - The patient did note that she has been having some mild dysphagia.  Would recommend starting Magic mouthwash  - c/w neutropenic precautions   - continue filgrastim 480 mcg daily - can give dose today, if increase of anc >1500 tomorrow, can dc   - Today, WBC 3.28, anc 1020      Dr. Reynold Maldonado  cell: [phone]  Weekends and nights please call [phone] for MD on call  [facility]  Hematology/Oncology

## 2022-05-13 NOTE — H&P ADULT - NSICDXFAMILYHX_GEN_ALL_CORE_FT
FAMILY HISTORY:  Father  Still living? Unknown  FH: heart attack, Age at diagnosis: Age Unknown    Mother  Still living? Unknown  Family history of benign tumor of cerebral meninges, Age at diagnosis: Age Unknown

## 2022-05-13 NOTE — PROVIDER CONTACT NOTE (OTHER) - SITUATION
spoke to panda from service aware of consult
notified Dr Melissa's office of admission please fax over discharge paperwork once patient is discharged to [phone]

## 2022-05-13 NOTE — H&P ADULT - ENMT COMMENTS
erythematous oral mucosa, no lesions/ulcerations seen, no candidiasis present, no tonsillar exudates

## 2022-05-13 NOTE — PATIENT PROFILE ADULT - FALL HARM RISK - UNIVERSAL INTERVENTIONS
Bed in lowest position, wheels locked, appropriate side rails in place/Call bell, personal items and telephone in reach/Instruct patient to call for assistance before getting out of bed or chair/Non-slip footwear when patient is out of bed/[place] to call system/Physically safe environment - no spills, clutter or unnecessary equipment/Purposeful Proactive Rounding/Room/bathroom lighting operational, light cord in reach

## 2022-05-13 NOTE — H&P ADULT - HISTORY OF PRESENT ILLNESS
Pt is a 55 yo female with a pmh/o HTN, pericardial cyst, palpitations, Hashimotos thyroiditis, Rheaumatic fever, MVP, type IIB thymoma, who had first cycle of CAP chemotherapy on 4/28, who presents to ED after recieving zosyn, neupogen, and tylenol at [facility] for fever to 101 and neutropenia to 0. Pt states she has had two days of sore throat and sensation as if roof of mouth is burning and is only able to tolerate soft foods. States she also woke up with fever 100.3 this AM. Endorses no other complaints. Per [facility] paperwork, CXR unchanged and urinalysis wnl.     Denies HA, sob, cough, chest pain, diaphoresis, chills, nausea, vomiting, oral ulcers/sores, abd pain, constipation, diarrhea, rash, open wounds/lesions, dysuria, paresthesias, gait change, changes in vision/hearing/speech, leg swelling.

## 2022-05-13 NOTE — CONSULT NOTE ADULT - ASSESSMENT
57 y/o female with h/o HTN, pericardial cyst, palpitations, Hashimoto thyroiditis, rheumatic fever, MVP, type IIB thymoma s/p first cycle of CAP chemotherapy on 4/28, was admitted on 5/12 for fever and neutropenia. [facility] outpatient center where she received zosyn, neupogen for fever to 101F and neutropenia to 0. She was referred to the hospital for evaluation. Pt states she has had two days of sore throat and sensation as if roof of mouth is burning and is only able to tolerate soft foods. States she also woke up with fever 100.3F on the day of admission. Endorses no other complaints. Per [facility] paperwork, CXR unchanged and urinalysis wnl. In ER she received zosyn.     1. Febrile syndrome. Absolute neutropenia. Thymoma s/p chemotherapy. Immunocompromised host.   -obtain BC x 2, urine c/s  -agree with zosyn 3.375 gm IV q8h  -reason for abx use and side effects reviewed with patient; monitor BMP   -old chart reviewed to assess prior cultures  -monitor temps  -f/u CBC  -supportive care  2. Other issues:   -care per medicine   57 y/o female with h/o HTN, pericardial cyst, palpitations, Hashimoto thyroiditis, rheumatic fever, MVP, type IIB thymoma s/p first cycle of CAP chemotherapy on 4/28, was admitted on 5/12 for fever and neutropenia. She at [facility] [place] [place] outpatient center where she received zosyn, neupogen for fever to 101F and neutropenia to 0. She was referred to the hospital for evaluation. Pt states she has had two days of sore throat and sensation as if roof of mouth is burning and is only able to tolerate soft foods. States she also woke up with fever 100.3F on the day of admission. Endorses no other complaints. Per [facility] paperwork, CXR unchanged and urinalysis wnl. In ER she received zosyn.     1. Febrile syndrome. Absolute neutropenia. Thymoma s/p chemotherapy. Immunocompromised host.   -obtain BC x 2, urine c/s  -agree with zosyn 3.375 gm IV q8h  -reason for abx use and side effects reviewed with patient; monitor BMP   -old chart reviewed to assess prior cultures  -oncology evaluation  -monitor temps  -f/u CBC  -supportive care  2. Other issues:   -care per medicine

## 2022-05-13 NOTE — PROGRESS NOTE ADULT - SUBJECTIVE AND OBJECTIVE BOX
CC: Neutropenic fever, pancytopenia, hypokalemia  History of Present Illness:   Pt is a 57 yo female with a pmh/o HTN, pericardial cyst, palpitations, Hashimotos thyroiditis, Rheaumatic fever, MVP, type IIB thymoma, who had first cycle of CAP chemotherapy on , who presents to ED after recieving zosyn, [place]upogen, [place] [facility] [place] 101 and neutropenia to 0. Pt states she has had two days of sore throat and sensation as if roof of mouth is burning and is only able to tolerate soft foods. States she also woke up with fever 100.3 this AM. Endorses no other complaints. Per [facility] [place], CXR unchanged and urinalysis wnl.     : Feels well.  Denies fever, chills, N, V, abd pain, CP, SOB.    REVIEW OF SYSTEMS: All other review of systems is negative unless indicated above.      Vital Signs Last 24 Hrs  T(C): 36.8 (13 May 2022 08:50), Max: 37.1 (12 May 2022 17:51)  T(F): 98.3 (13 May 2022 08:50), Max: 98.7 (12 May 2022 17:51)  HR: 88 (13 May 2022 08:50) (67 - 88)  BP: 116/48 (13 May 2022 08:50) (102/59 - 127/67)  BP(mean): --  RR: 16 (13 May 2022 08:50) (16 - 18)  SpO2: 97% (13 May 2022 08:50) (92% - 99%)    PHYSICAL EXAM:    Constitutional: NAD, awake and alert, well-developed  HEENT: PERR, EOMI, Normal Hearing, MMM  Neck: Soft and supple  Respiratory: Breath sounds are clear bilaterally, No wheezing, rales or rhonchi  Cardiovascular: S1 and S2, regular rate and rhythm, no Murmurs, gallops or rubs  Gastrointestinal: Bowel Sounds present, soft, nontender, nondistended, no guarding, no rebound  Extremities: No peripheral edema  Neurological: A/O x 3, no focal deficits in my limited exam          MEDICATIONS  (STANDING):  cholecalciferol 5000 Unit(s) Oral daily  escitalopram 5 milliGRAM(s) Oral daily  filgrastim-sndz (ZARXIO) Injectable 480 MICROGram(s) SubCutaneous daily  FIRST- Mouthwash  BLM 5 milliLiter(s) Swish and Swallow four times a day  levothyroxine 88 MICROGram(s) Oral daily  metoprolol succinate ER 25 milliGRAM(s) Oral daily  piperacillin/tazobactam IVPB.. 3.375 Gram(s) IV Intermittent every 8 hours  potassium chloride    Tablet ER 20 milliEquivalent(s) Oral once    MEDICATIONS  (PRN):  acetaminophen     Tablet .. 650 milliGRAM(s) Oral every 6 hours PRN Temp greater or equal to 38C (100.4F), Mild Pain (1 - 3)  ALPRAZolam 0.25 milliGRAM(s) Oral every 8 hours PRN anxiety  aluminum hydroxide/magnesium hydroxide/simethicone Suspension 30 milliLiter(s) Oral every 4 hours PRN Dyspepsia  melatonin 3 milliGRAM(s) Oral at bedtime PRN Insomnia  ondansetron Injectable 4 milliGRAM(s) IV Push every 8 hours PRN Nausea and/or Vomiting                              11.2   3.28  )-----------( 291      ( 13 May 2022 08:09 )             32.9     05-13    140  |  106  |  13  ----------------------------<  96  3.4<L>   |  28  |  0.97    Ca    9.5      13 May 2022 08:09    TPro  6.9  /  Alb  3.0<L>  /  TBili  0.4  /  DBili  x   /  AST  26  /  ALT  38  /  AlkPhos  79  05-13    CAPILLARY BLOOD GLUCOSE        LIVER FUNCTIONS - ( 13 May 2022 08:09 )  Alb: 3.0 g/dL / Pro: 6.9 gm/dL / ALK PHOS: 79 U/L / ALT: 38 U/L / AST: 26 U/L / GGT: x           PT/INR - ( 13 May 2022 08:09 )   PT: 12.5 sec;   INR: 1.08 ratio         PTT - ( 13 May 2022 08:09 )  PTT:27.1 sec  Urinalysis Basic - ( 13 May 2022 02:49 )    Color: Yellow / Appearance: Clear / S.010 / pH: x  Gluc: x / Ketone: Negative  / Bili: Negative / Urobili: Negative   Blood: x / Protein: 15 / Nitrite: Negative   Leuk Esterase: Negative / RBC: Negative /HPF / WBC Negative   Sq Epi: x / Non Sq Epi: Occasional / Bacteria: Negative            Assessment and Plan:  56-year-old female history of recently diagnosed thymoma type IIb presently on CAP (cyclophosphamide, Adriamycin, cisplatin) now after her first treatment admitted for severe neutropenia and fever.    # neutropenic fever: Due to recent chemo  - s/p filgrastim out patient, continue  - WBC 1.82 --> 3.28  - c/w empiric zosyn per ID  - f/u cultures  - Oncology following  - magic mouthwash for oral sores   - c/w neutropenic precautions       #HTN  c/w metoprolol    #Anxiety  c/w lexapro  c/w xanax prn

## 2022-05-13 NOTE — H&P ADULT - CONVERSATION DETAILS
17 min spent discussing advanced care planning and pt is a full code and accepts intubation trial and cpr should it be required.

## 2022-05-13 NOTE — CONSULT NOTE ADULT - SUBJECTIVE AND OBJECTIVE BOX
HPI:  This is a 56-year-old female recently having been diagnosed with type IIb thymoma with the largest anterior dimension of approximately 13 to 14 cm who had recently been advised to start on neoadjuvant systemic chemotherapy now treated by [facility] received her first cycle of chemo with CAP (cyclophosphamide Adriamycin and cisplatin).  The patient noted that over the last several days she had developed a low-grade fever with a T-max 100.8.  Denies any shortness of breath difficulty breathing cough diarrhea or any additional infectious symptoms.  The patient was advised to come to the hospital for further evaluation after having outpatient laboratory studies which demonstrated an ANC of 0.0.  PAST MEDICAL & SURGICAL HISTORY:  No significant past surgical history          Allergies    No Known Allergies    Intolerances        MEDICATIONS  (STANDING):    MEDICATIONS  (PRN):      FAMILY HISTORY:      SOCIAL HISTORY: No EtOH, no tobacco    REVIEW OF SYSTEMS:    CONSTITUTIONAL: No weakness, fevers or chills  EYES/ENT: No visual changes;  No vertigo or throat pain   NECK: No pain or stiffness  RESPIRATORY: No cough, wheezing, hemoptysis; No shortness of breath  CARDIOVASCULAR: No chest pain or palpitations  GASTROINTESTINAL: No abdominal or epigastric pain. No nausea, vomiting, or hematemesis; No diarrhea or constipation. No melena or hematochezia.  GENITOURINARY: No dysuria, frequency or hematuria  NEUROLOGICAL: No numbness or weakness  SKIN: No itching, burning, rashes, or lesions   All other review of systems is negative unless indicated above.      Weight (kg): 72.6 (05-12 @ 17:26)    T(F): 98.5 (05-13-22 @ 01:50), Max: 98.7 (05-12-22 @ 17:51)  HR: 67 (05-13-22 @ 01:50)  BP: 102/59 (05-13-22 @ 01:50)  RR: 16 (05-13-22 @ 01:50)  SpO2: 96% (05-13-22 @ 01:50)  Wt(kg): --    GENERAL: NAD, well-developed  HEAD:  Atraumatic, Normocephalic  EYES: EOMI, PERRLA, conjunctiva and sclera clear  NECK: Supple, No JVD  CHEST/LUNG: Clear to auscultation bilaterally; No wheeze  HEART: Regular rate and rhythm; No murmurs, rubs, or gallops  ABDOMEN: Soft, Nontender, Nondistended; Bowel sounds present  EXTREMITIES:  2+ Peripheral Pulses, No clubbing, cyanosis, or edema  NEUROLOGY: non-focal  SKIN: No rashes or lesions                          11.0   1.82  )-----------( 279      ( 12 May 2022 21:49 )             31.6       05-12    138  |  104  |  15  ----------------------------<  115<H>  3.2<L>   |  28  |  0.92    Ca    9.4      12 May 2022 21:49    TPro  7.0  /  Alb  3.3  /  TBili  0.3  /  DBili  x   /  AST  24  /  ALT  37  /  AlkPhos  78  05-12          PT/INR - ( 12 May 2022 21:49 )   PT: 12.4 sec;   INR: 1.07 ratio         PTT - ( 12 May 2022 21:49 )  PTT:27.1 sec    
Patient is a 56y old  Female who presents with a chief complaint of Neutropenic fever, pancytopenia, hypokalemia     HPI:  55 y/o female with h/o HTN, pericardial cyst, palpitations, Hashimoto thyroiditis, rheumatic fever, MVP, type IIB thymoma s/p first cycle of CAP chemotherapy on , was admitted on  for fever and neutropenia. [place] [facility] [place] she received zosyn, neupogen for fever to 101F and neutropenia to 0. She was referred to the hospital for evaluation. Pt states she has had two days of sore throat and sensation as if roof of mouth is burning and is only able to tolerate soft foods. States she also woke up with fever 100.3F on the day of admission. Endorses no other complaints. Per [facility] paperwork, CXR unchanged and urinalysis wnl. In ER she received zosyn.       PMH: as above  PSH: as above  Meds: per reconciliation sheet, noted below  MEDICATIONS  (STANDING):  cholecalciferol 5000 Unit(s) Oral daily  escitalopram 5 milliGRAM(s) Oral daily  filgrastim-sndz (ZARXIO) Injectable 480 MICROGram(s) SubCutaneous daily  FIRST- Mouthwash  BLM 5 milliLiter(s) Swish and Swallow four times a day  levothyroxine 88 MICROGram(s) Oral daily  metoprolol succinate ER 25 milliGRAM(s) Oral daily  piperacillin/tazobactam IVPB.. 3.375 Gram(s) IV Intermittent every 8 hours    MEDICATIONS  (PRN):  acetaminophen     Tablet .. 650 milliGRAM(s) Oral every 6 hours PRN Temp greater or equal to 38C (100.4F), Mild Pain (1 - 3)  ALPRAZolam 0.25 milliGRAM(s) Oral every 8 hours PRN anxiety  aluminum hydroxide/magnesium hydroxide/simethicone Suspension 30 milliLiter(s) Oral every 4 hours PRN Dyspepsia  melatonin 3 milliGRAM(s) Oral at bedtime PRN Insomnia  ondansetron Injectable 4 milliGRAM(s) IV Push every 8 hours PRN Nausea and/or Vomiting    Allergies    No Known Allergies    Intolerances      Social: no smoking, no alcohol, no illegal drugs; no recent travel, no exposure to TB  FAMILY HISTORY:  FH: heart attack (Father)    Family history of benign tumor of cerebral meninges (Mother)      no history of premature cardiovascular disease in first degree relatives    ROS: the patient denies HA, no seizures, no dizziness, no sore throat, no nasal congestion, no blurry vision, no CP, no palpitations, no SOB, no cough, no abdominal pain, no diarrhea, no N/V, no dysuria, no leg pain, no claudication, no rash, no joint aches, no rectal pain or bleeding, no night sweats; has increased weakness  All other systems reviewed and are negative    Vital Signs Last 24 Hrs  T(C): 36.7 (13 May 2022 03:07), Max: 37.1 (12 May 2022 17:51)  T(F): 98.1 (13 May 2022 03:07), Max: 98.7 (12 May 2022 17:51)  HR: 86 (13 May 2022 03:07) (67 - 86)  BP: 125/59 (13 May 2022 03:07) (102/59 - 127/67)  BP(mean): --  RR: 16 (13 May 2022 03:07) (16 - 18)  SpO2: 92% (13 May 2022 03:07) (92% - 99%)  Daily     Daily     PE:    Constitutional:  No acute distress  HEENT: NC/AT, EOMI, PERRLA, conjunctivae clear; ears and nose atraumatic; pharynx benign  Neck: supple; thyroid not palpable  Back: no tenderness  Respiratory: respiratory effort normal; decreased BS at bases  Cardiovascular: S1S2 regular, no murmurs  Abdomen: soft, not tender, not distended, positive BS; no liver or spleen organomegaly  Genitourinary: no suprapubic tenderness  Lymphatic: no LN palpable  Musculoskeletal: no muscle tenderness, no joint swelling or tenderness  Extremities: no pedal edema  Neurological/ Psychiatric: AxOx3, judgement and insight normal; moving all extremities  Skin: no rashes; no palpable lesions    Labs: all available labs reviewed                        11.0   1.82  )-----------( 279      ( 12 May 2022 21:49 )             31.6     05-12    138  |  104  |  15  ----------------------------<  115<H>  3.2<L>   |  28  |  0.92    Ca    9.4      12 May 2022 21:49    TPro  7.0  /  Alb  3.3  /  TBili  0.3  /  DBili  x   /  AST  24  /  ALT  37  /  AlkPhos  78  05-12     LIVER FUNCTIONS - ( 12 May 2022 21:49 )  Alb: 3.3 g/dL / Pro: 7.0 gm/dL / ALK PHOS: 78 U/L / ALT: 37 U/L / AST: 24 U/L / GGT: x           Urinalysis Basic - ( 13 May 2022 02:49 )    Color: Yellow / Appearance: Clear / S.010 / pH: x  Gluc: x / Ketone: Negative  / Bili: Negative / Urobili: Negative   Blood: x / Protein: 15 / Nitrite: Negative   Leuk Esterase: Negative / RBC: Negative /HPF / WBC Negative   Sq Epi: x / Non Sq Epi: Occasional / Bacteria: Negative    ( @ 23:29)  NotDete    Radiology: all available radiological tests reviewed        Advanced directives addressed: full resuscitation

## 2022-05-13 NOTE — H&P ADULT - NSICDXPASTMEDICALHX_GEN_ALL_CORE_FT
PAST MEDICAL HISTORY:  Anxiety     H/O: rheumatic fever     Hashimoto's thyroiditis     History of palpitations     HTN (hypertension)     Mitral valve prolapse     Pericardial cyst       History of thymoma

## 2022-05-14 ENCOUNTER — TRANSCRIPTION ENCOUNTER (OUTPATIENT)
Age: 57
End: 2022-05-14

## 2022-05-14 VITALS — SYSTOLIC BLOOD PRESSURE: 114 MMHG | HEART RATE: 72 BPM | DIASTOLIC BLOOD PRESSURE: 56 MMHG

## 2022-05-14 LAB
ANION GAP SERPL CALC-SCNC: 7 MMOL/L — SIGNIFICANT CHANGE UP (ref 5–17)
BUN SERPL-MCNC: 10 MG/DL — SIGNIFICANT CHANGE UP (ref 7–23)
CALCIUM SERPL-MCNC: 9.5 MG/DL — SIGNIFICANT CHANGE UP (ref 8.5–10.1)
CHLORIDE SERPL-SCNC: 109 MMOL/L — HIGH (ref 96–108)
CO2 SERPL-SCNC: 27 MMOL/L — SIGNIFICANT CHANGE UP (ref 22–31)
CREAT SERPL-MCNC: 1.07 MG/DL — SIGNIFICANT CHANGE UP (ref 0.5–1.3)
CULTURE RESULTS: NO GROWTH — SIGNIFICANT CHANGE UP
EGFR: 60 ML/MIN/1.73M2 — SIGNIFICANT CHANGE UP
GLUCOSE SERPL-MCNC: 113 MG/DL — HIGH (ref 70–99)
HCT VFR BLD CALC: 31.4 % — LOW (ref 34.5–45)
HGB BLD-MCNC: 10.8 G/DL — LOW (ref 11.5–15.5)
MCHC RBC-ENTMCNC: 29.3 PG — SIGNIFICANT CHANGE UP (ref 27–34)
MCHC RBC-ENTMCNC: 34.4 GM/DL — SIGNIFICANT CHANGE UP (ref 32–36)
MCV RBC AUTO: 85.1 FL — SIGNIFICANT CHANGE UP (ref 80–100)
PLATELET # BLD AUTO: 314 K/UL — SIGNIFICANT CHANGE UP (ref 150–400)
POTASSIUM SERPL-MCNC: 3.5 MMOL/L — SIGNIFICANT CHANGE UP (ref 3.5–5.3)
POTASSIUM SERPL-SCNC: 3.5 MMOL/L — SIGNIFICANT CHANGE UP (ref 3.5–5.3)
RBC # BLD: 3.69 M/UL — LOW (ref 3.8–5.2)
RBC # FLD: 12.5 % — SIGNIFICANT CHANGE UP (ref 10.3–14.5)
SODIUM SERPL-SCNC: 143 MMOL/L — SIGNIFICANT CHANGE UP (ref 135–145)
SPECIMEN SOURCE: SIGNIFICANT CHANGE UP
WBC # BLD: 14.06 K/UL — HIGH (ref 3.8–10.5)
WBC # FLD AUTO: 14.06 K/UL — HIGH (ref 3.8–10.5)

## 2022-05-14 PROCEDURE — 99239 HOSP IP/OBS DSCHRG MGMT >30: CPT

## 2022-05-14 RX ADMIN — DIPHENHYDRAMINE HYDROCHLORIDE AND LIDOCAINE HYDROCHLORIDE AND ALUMINUM HYDROXIDE AND MAGNESIUM HYDRO 5 MILLILITER(S): KIT at 05:53

## 2022-05-14 RX ADMIN — Medication 5000 UNIT(S): at 10:24

## 2022-05-14 RX ADMIN — PIPERACILLIN AND TAZOBACTAM 25 GRAM(S): 4; .5 INJECTION, POWDER, LYOPHILIZED, FOR SOLUTION INTRAVENOUS at 05:53

## 2022-05-14 RX ADMIN — Medication 88 MICROGRAM(S): at 05:54

## 2022-05-14 RX ADMIN — ESCITALOPRAM OXALATE 5 MILLIGRAM(S): 10 TABLET, FILM COATED ORAL at 10:24

## 2022-05-14 RX ADMIN — DIPHENHYDRAMINE HYDROCHLORIDE AND LIDOCAINE HYDROCHLORIDE AND ALUMINUM HYDROXIDE AND MAGNESIUM HYDRO 5 MILLILITER(S): KIT at 12:14

## 2022-05-14 RX ADMIN — Medication 25 MILLIGRAM(S): at 10:24

## 2022-05-14 NOTE — PROGRESS NOTE ADULT - SUBJECTIVE AND OBJECTIVE BOX
Date of service: 05-14-22 @ 14:27      Patient feeling well, no complaints, afebrile      ROS: no fever or chills; denies dizziness, no HA, no SOB or cough, no abdominal pain, no diarrhea or constipation; no dysuria, no urinary frequency, no legs pain, no rashes    MEDICATIONS  (STANDING):  cholecalciferol 5000 Unit(s) Oral daily  escitalopram 5 milliGRAM(s) Oral daily  FIRST- Mouthwash  BLM 5 milliLiter(s) Swish and Swallow four times a day  levothyroxine 88 MICROGram(s) Oral daily  metoprolol succinate ER 25 milliGRAM(s) Oral daily  piperacillin/tazobactam IVPB.. 3.375 Gram(s) IV Intermittent every 8 hours    MEDICATIONS  (PRN):  acetaminophen     Tablet .. 650 milliGRAM(s) Oral every 6 hours PRN Temp greater or equal to 38C (100.4F), Mild Pain (1 - 3)  ALPRAZolam 0.25 milliGRAM(s) Oral every 8 hours PRN anxiety  aluminum hydroxide/magnesium hydroxide/simethicone Suspension 30 milliLiter(s) Oral every 4 hours PRN Dyspepsia  melatonin 3 milliGRAM(s) Oral at bedtime PRN Insomnia  ondansetron Injectable 4 milliGRAM(s) IV Push every 8 hours PRN Nausea and/or Vomiting      Vital Signs Last 24 Hrs  T(C): 36.5 (14 May 2022 08:26), Max: 37.8 (13 May 2022 23:59)  T(F): 97.7 (14 May 2022 08:26), Max: 100 (13 May 2022 23:59)  HR: 72 (14 May 2022 10:22) (65 - 90)  BP: 114/56 (14 May 2022 10:22) (97/- - 114/56)  BP(mean): --  RR: 16 (14 May 2022 08:26) (16 - 16)  SpO2: 95% (14 May 2022 08:26) (93% - 95%)        Physical Exam:            Constitutional:  No acute distress  HEENT: NC/AT, EOMI, PERRLA, conjunctivae clear; ears and nose atraumatic; pharynx benign, small blister on lip  Neck: supple; thyroid not palpable  Back: no tenderness  Respiratory: respiratory effort normal; decreased BS at bases  Cardiovascular: S1S2 regular, no murmurs  Abdomen: soft, not tender, not distended, positive BS; no liver or spleen organomegaly  Genitourinary: no suprapubic tenderness  Lymphatic: no LN palpable  Musculoskeletal: no muscle tenderness, no joint swelling or tenderness  Extremities: no pedal edema  Neurological/ Psychiatric: AxOx3, judgement and insight normal; moving all extremities  Skin: no rashes; no palpable lesions    Labs: all available labs reviewed                     Labs:                        10.8   14.06 )-----------( 314      ( 14 May 2022 09:00 )             31.4     05-14    143  |  109<H>  |  10  ----------------------------<  113<H>  3.5   |  27  |  1.07    Ca    9.5      14 May 2022 09:00    TPro  6.9  /  Alb  3.0<L>  /  TBili  0.4  /  DBili  x   /  AST  26  /  ALT  38  /  AlkPhos  79  05-13           Cultures:       Culture - Blood (collected 05-12-22 @ 21:49)  Source: .Blood None  Preliminary Report (05-14-22 @ 02:01):    No growth to date.    Culture - Blood (collected 05-12-22 @ 19:07)  Source: .Blood None  Preliminary Report (05-14-22 @ 02:01):    No growth to date.              Radiology: all available radiological tests reviewed        Advanced directives addressed: full resuscitation

## 2022-05-14 NOTE — PROGRESS NOTE ADULT - ASSESSMENT
This is a 56-year-old female history of recently diagnosed thymoma type IIb presently on CAP (cyclophosphamide, Adriamycin, cisplatin) now after her first treatment admitted for severe neutropenia and fever.    # neutropenic fever  - ANC as an outpatient was 0.0.  5/12 ws 0.22  - received one-time dose of filgrastim in the office prior to on route to the hospital.    - seen by ID- on zosyn  - c/w neutropenic precautions   - continue filgrastim 480 mcg daily -, if increase of anc >1500, can dc - pending am labs  - 5/13 WBC 3.28, anc 1020 trending up and received 3rd total dose of zarxio (1 given outpatient) yest      Dr. Reynold Maldonado  cell: [phone]  Weekends and nights please call [phone] for MD on call  [facility]  Hematology/Oncology  This is a 56-year-old female history of recently diagnosed thymoma type IIb presently on CAP (cyclophosphamide, Adriamycin, cisplatin) now after her first treatment admitted for severe neutropenia and fever.    # neutropenic fever  - ANC as an outpatient was 0.0.  5/12 ws 0.22  - received one-time dose of filgrastim in the office prior to on route to the hospital.    - seen by ID- on zosyn- pt without a source at this time, tmax 100 overnight- to discuss with ID regarding continued abx on discharge  - d/c neutropenic precautions   - dc filgrastim 480 mcg- WBC 15k today (given 2 doses inpatient)    dispo: cleared for dc today from onc pending id recs on dc      Dr. Reynold Maldonado  cell: [phone]  Weekends and nights please call [phone] for MD on call  [facility]  Hematology/Oncology

## 2022-05-14 NOTE — DISCHARGE NOTE PROVIDER - NSDCFUSCHEDAPPT_GEN_ALL_CORE_FT
[facility]  Cardiology [address]  Scheduled Appointment: 05/25/2022

## 2022-05-14 NOTE — DISCHARGE NOTE PROVIDER - NSDCMRMEDTOKEN_GEN_ALL_CORE_FT
Lexapro 5 mg oral tablet: 1 tab(s) orally once a day  Metoprolol Succinate ER 25 mg oral tablet, extended release: 1 tab(s) orally once a day  OLANZapine 5 mg oral tablet: 1 tab(s) orally once a day WITH CHEMO  Synthroid 88 mcg (0.088 mg) oral tablet: 1 tab(s) orally once a day  Vitamin D3 125 mcg (5000 intl units) oral tablet: 1 tab(s) orally once a day  Xanax 0.25 mg oral tablet: 1 tab(s) orally every 8 hours, As Needed  Zinc 140 mg (as elemental zinc 50 mg) oral tablet: 1 tab(s) orally once a day  Zofran ODT 8 mg oral tablet, disintegratin tab(s) orally 3 times a day, As Needed

## 2022-05-14 NOTE — DISCHARGE NOTE NURSING/CASE MANAGEMENT/SOCIAL WORK - NSDCPEFALRISK_GEN_ALL_CORE
For information on Fall & Injury Prevention, visit: [email or web address] OR  [email or web address] OR  https://www.cdc.gov/steadi/patient.html

## 2022-05-14 NOTE — DISCHARGE NOTE PROVIDER - NSDCCPCAREPLAN_GEN_ALL_CORE_FT
PRINCIPAL DISCHARGE DIAGNOSIS  Diagnosis: Neutropenic fever  Assessment and Plan of Treatment: RESOLVED  no infection.  white blood cell no longer low.  Follow up with your oncologist for ongoing care.      SECONDARY DISCHARGE DIAGNOSES  Diagnosis: H/O thymoma  Assessment and Plan of Treatment:

## 2022-05-14 NOTE — PROGRESS NOTE ADULT - ASSESSMENT
55 y/o female with h/o HTN, pericardial cyst, palpitations, Hashimoto thyroiditis, rheumatic fever, MVP, type IIB thymoma s/p first cycle of CAP chemotherapy on 4/28, was admitted on 5/12 for fever and neutropenia. She [place] [facility] [place] where she received zosyn, neupogen for fever to 101F and neutropenia to 0. She was referred to the hospital for evaluation. Pt states she has had two days of sore throat and sensation as if roof of mouth is burning and is only able to tolerate soft foods. States she also woke up with fever 100.3F on the day of admission. Endorses no other complaints. Per [facility] paperwork, CXR unchanged and urinalysis wnl. In ER she received zosyn.     1. Febrile syndrome. Absolute neutropenia. Thymoma s/p chemotherapy. Immunocompromised host.   -obtain BC x 2, urine c/s--- no growth  - wbc count responded to zarandryo  - elaina from id standpoint to discharge home on no antibiotics, lip blister most likely aphthous ulcer, can use vaseline ointment  -oncology evaluation  -monitor temps  -f/u CBC  -supportive care  2. Other issues:   -care per medicine

## 2022-05-14 NOTE — PROGRESS NOTE ADULT - SUBJECTIVE AND OBJECTIVE BOX
INTERVAL HPI/OVERNIGHT EVENTS:  Patient S&E at bedside. No o/n events, tmax 100, /51 this am, 93% on RA. Labs pending for today  Pt on zosyn     PAST MEDICAL & SURGICAL HISTORY:  HTN (hypertension)      Pericardial cyst      History of palpitations      Anxiety      Hashimoto&#x27;s thyroiditis      H/O: rheumatic fever      Mitral valve prolapse      S/P ovarian cystectomy          FAMILY HISTORY:  FH: heart attack (Father)    Family history of benign tumor of cerebral meninges (Mother)        VITAL SIGNS:  T(F): 100 (22 @ 23:59)  HR: 77 (22 @ 23:59)  BP: 104/51 (22 @ 23:59)  RR: 16 (22 @ 23:59)  SpO2: 93% (22 @ 23:59)  Wt(kg): --    PHYSICAL EXAM:    Constitutional: NAD  Eyes: EOMI, sclera non-icteric  Neck: supple, no masses, no JVD  Respiratory: CTA b/l, good air entry b/l  Cardiovascular: RRR, no M/R/G  Gastrointestinal: soft, NTND, no masses palpable, + BS, no hepatosplenomegaly  Extremities: no c/c/e  Neurological: AAOx3      MEDICATIONS  (STANDING):  cholecalciferol 5000 Unit(s) Oral daily  escitalopram 5 milliGRAM(s) Oral daily  filgrastim-sndz (ZARXIO) Injectable 480 MICROGram(s) SubCutaneous daily  FIRST- Mouthwash  BLM 5 milliLiter(s) Swish and Swallow four times a day  levothyroxine 88 MICROGram(s) Oral daily  metoprolol succinate ER 25 milliGRAM(s) Oral daily  piperacillin/tazobactam IVPB.. 3.375 Gram(s) IV Intermittent every 8 hours    MEDICATIONS  (PRN):  acetaminophen     Tablet .. 650 milliGRAM(s) Oral every 6 hours PRN Temp greater or equal to 38C (100.4F), Mild Pain (1 - 3)  ALPRAZolam 0.25 milliGRAM(s) Oral every 8 hours PRN anxiety  aluminum hydroxide/magnesium hydroxide/simethicone Suspension 30 milliLiter(s) Oral every 4 hours PRN Dyspepsia  melatonin 3 milliGRAM(s) Oral at bedtime PRN Insomnia  ondansetron Injectable 4 milliGRAM(s) IV Push every 8 hours PRN Nausea and/or Vomiting      Allergies    No Known Allergies    Intolerances        LABS:                        11.2   3.28  )-----------( 291      ( 13 May 2022 08:09 )             32.9     05-13    140  |  106  |  13  ----------------------------<  96  3.4<L>   |  28  |  0.97    Ca    9.5      13 May 2022 08:09    TPro  6.9  /  Alb  3.0<L>  /  TBili  0.4  /  DBili  x   /  AST  26  /  ALT  38  /  AlkPhos  79  05-13    PT/INR - ( 13 May 2022 08:09 )   PT: 12.5 sec;   INR: 1.08 ratio         PTT - ( 13 May 2022 08:09 )  PTT:27.1 sec  Urinalysis Basic - ( 13 May 2022 02:49 )    Color: Yellow / Appearance: Clear / S.010 / pH: x  Gluc: x / Ketone: Negative  / Bili: Negative / Urobili: Negative   Blood: x / Protein: 15 / Nitrite: Negative   Leuk Esterase: Negative / RBC: Negative /HPF / WBC Negative   Sq Epi: x / Non Sq Epi: Occasional / Bacteria: Negative        RADIOLOGY & ADDITIONAL TESTS:  Studies reviewed.   INTERVAL HPI/OVERNIGHT EVENTS:  Patient S&E at bedside. No o/n events, tmax 100, /51 this am, 93% on RA. Labs pending for today  Pt on zosyn   Today with cold sore on her lip as well as white spots on the roof of her mouth, mild pain on swallowing    PAST MEDICAL & SURGICAL HISTORY:  HTN (hypertension)      Pericardial cyst      History of palpitations      Anxiety      Hashimoto&#x27;s thyroiditis      H/O: rheumatic fever      Mitral valve prolapse      S/P ovarian cystectomy          FAMILY HISTORY:  FH: heart attack (Father)    Family history of benign tumor of cerebral meninges (Mother)        VITAL SIGNS:  T(F): 100 (22 @ 23:59)  HR: 77 (22 @ 23:59)  BP: 104/51 (22 @ 23:59)  RR: 16 (22 @ 23:59)  SpO2: 93% (22 @ 23:59)  Wt(kg): --    PHYSICAL EXAM:    Constitutional: NAD, cold sore on lip  mouth: white spots on roof of mouth, no signs of thrush  Neck: supple, no masses, no JVD  Respiratory: CTA b/l, good air entry b/l  Cardiovascular: RRR, no M/R/G  Gastrointestinal: soft, NTND, no masses palpable, + BS, no hepatosplenomegaly  Extremities: no c/c/e  Neurological: AAOx3      MEDICATIONS  (STANDING):  cholecalciferol 5000 Unit(s) Oral daily  escitalopram 5 milliGRAM(s) Oral daily  filgrastim-sndz (ZARXIO) Injectable 480 MICROGram(s) SubCutaneous daily  FIRST- Mouthwash  BLM 5 milliLiter(s) Swish and Swallow four times a day  levothyroxine 88 MICROGram(s) Oral daily  metoprolol succinate ER 25 milliGRAM(s) Oral daily  piperacillin/tazobactam IVPB.. 3.375 Gram(s) IV Intermittent every 8 hours    MEDICATIONS  (PRN):  acetaminophen     Tablet .. 650 milliGRAM(s) Oral every 6 hours PRN Temp greater or equal to 38C (100.4F), Mild Pain (1 - 3)  ALPRAZolam 0.25 milliGRAM(s) Oral every 8 hours PRN anxiety  aluminum hydroxide/magnesium hydroxide/simethicone Suspension 30 milliLiter(s) Oral every 4 hours PRN Dyspepsia  melatonin 3 milliGRAM(s) Oral at bedtime PRN Insomnia  ondansetron Injectable 4 milliGRAM(s) IV Push every 8 hours PRN Nausea and/or Vomiting      Allergies    No Known Allergies    Intolerances        LABS:                        11.2   3.28  )-----------( 291      ( 13 May 2022 08:09 )             32.9     05    140  |  106  |  13  ----------------------------<  96  3.4<L>   |  28  |  0.97    Ca    9.5      13 May 2022 08:09    TPro  6.9  /  Alb  3.0<L>  /  TBili  0.4  /  DBili  x   /  AST  26  /  ALT  38  /  AlkPhos  79  05-13    PT/INR - ( 13 May 2022 08:09 )   PT: 12.5 sec;   INR: 1.08 ratio         PTT - ( 13 May 2022 08:09 )  PTT:27.1 sec  Urinalysis Basic - ( 13 May 2022 02:49 )    Color: Yellow / Appearance: Clear / S.010 / pH: x  Gluc: x / Ketone: Negative  / Bili: Negative / Urobili: Negative   Blood: x / Protein: 15 / Nitrite: Negative   Leuk Esterase: Negative / RBC: Negative /HPF / WBC Negative   Sq Epi: x / Non Sq Epi: Occasional / Bacteria: Negative        RADIOLOGY & ADDITIONAL TESTS:  Studies reviewed.

## 2022-05-14 NOTE — DISCHARGE NOTE PROVIDER - HOSPITAL COURSE
CC: Neutropenic fever, pancytopenia, hypokalemia  History of Present Illness:   Pt is a 55 yo female with a pmh/o HTN, pericardial cyst, palpitations, Hashimotos thyroiditis, Rheaumatic fever, MVP, type IIB thymoma, who had first cycle of CAP chemotherapy on 4/28, who presents to ED after recieving zosyn, neupogen, and tylenol at [facility] for fever to 101 and neutropenia to 0. Pt states she has had two days of sore throat and sensation as if roof of mouth is burning and is only able to tolerate soft foods. States she also woke up with fever 100.3 this AM. Endorses no other complaints. Per [facility] paperwork, CXR unchanged and urinalysis wnl.     Hospital course:  Pt admitted for neutropenic fever, ruled out for infectious etiology, negative cultures, xray, urinalysis.  Pt placed on empiric zosyn however will stop upon discharge per ID.  Pt received neupogen x 2, ANC now normal, pt no longer neutropenic.  Pt feels well, denies fever, chills, n, v.  Has a sore throat but otherise doing okay.  She will follow up with her oncologist as out patient.    REVIEW OF SYSTEMS: All other review of systems is negative unless indicated above.    Vital Signs Last 24 Hrs  T(C): 36.5 (14 May 2022 08:26), Max: 37.8 (13 May 2022 23:59)  T(F): 97.7 (14 May 2022 08:26), Max: 100 (13 May 2022 23:59)  HR: 72 (14 May 2022 10:22) (65 - 90)  BP: 114/56 (14 May 2022 10:22) (97/- - 114/56)  BP(mean): --  RR: 16 (14 May 2022 08:26) (16 - 16)  SpO2: 95% (14 May 2022 08:26) (93% - 95%)    PHYSICAL EXAM:    Constitutional: NAD, awake and alert, well-developed  HEENT: PERR, EOMI, Normal Hearing, MMM, lower lip lesion, white spots noted roof of mouth  Neck: Soft and supple  Respiratory: Breath sounds are clear bilaterally, No wheezing, rales or rhonchi  Cardiovascular: S1 and S2, regular rate and rhythm, no Murmurs, gallops or rubs  Gastrointestinal: Bowel Sounds present, soft, nontender, nondistended, no guarding, no rebound  Extremities: No peripheral edema  Neurological: A/O x 3, no focal deficits in my limited exam    med/labs: Reviewed and interpreted     Assessment and Plan:  56-year-old female history of recently diagnosed thymoma type IIb presently on CAP (cyclophosphamide, Adriamycin, cisplatin) now after her first treatment admitted for severe neutropenia and fever.    # neutropenic fever: Due to recent chemo  - s/p filgrastim x 2, stop further  - WBC 1.82 --> 3.28 --> 14  - s/p further empiric zosyn per ID  - cultures no growth  - chest xray - large mediastinal mass  - Oncology following, cleared for d/c   - magic mouthwash for oral sores       #HTN  c/w metoprolol    #Anxiety  c/w lexapro  c/w xanax prn    discharge home with out patient follow up    Attending Statement: 40 minutes spent on total encounter and discharge planning.

## 2022-05-14 NOTE — DISCHARGE NOTE NURSING/CASE MANAGEMENT/SOCIAL WORK - PATIENT PORTAL LINK FT
You can access the FollowMyHealth Patient Portal offered by [facility] by registering at the following website: [email or web address]. By joining [employer]’s FollowMyHealth portal, you will also be able to view your health information using other applications (apps) compatible with our system.

## 2022-05-14 NOTE — PROGRESS NOTE ADULT - REASON FOR ADMISSION
Neutropenic fever, pancytopenia, hypokalemia

## 2022-05-18 LAB
CULTURE RESULTS: SIGNIFICANT CHANGE UP
CULTURE RESULTS: SIGNIFICANT CHANGE UP
SPECIMEN SOURCE: SIGNIFICANT CHANGE UP
SPECIMEN SOURCE: SIGNIFICANT CHANGE UP

## 2022-05-20 DIAGNOSIS — I10 ESSENTIAL (PRIMARY) HYPERTENSION: ICD-10-CM

## 2022-05-20 DIAGNOSIS — T45.1X5A ADVERSE EFFECT OF ANTINEOPLASTIC AND IMMUNOSUPPRESSIVE DRUGS, INITIAL ENCOUNTER: ICD-10-CM

## 2022-05-20 DIAGNOSIS — D61.810 ANTINEOPLASTIC CHEMOTHERAPY INDUCED PANCYTOPENIA: ICD-10-CM

## 2022-05-20 DIAGNOSIS — K12.0 RECURRENT ORAL APHTHAE: ICD-10-CM

## 2022-05-20 DIAGNOSIS — R50.81 FEVER PRESENTING WITH CONDITIONS CLASSIFIED ELSEWHERE: ICD-10-CM

## 2022-05-20 DIAGNOSIS — R73.9 HYPERGLYCEMIA, UNSPECIFIED: ICD-10-CM

## 2022-05-20 DIAGNOSIS — Z20.822 CONTACT WITH AND (SUSPECTED) EXPOSURE TO COVID-19: ICD-10-CM

## 2022-05-20 DIAGNOSIS — R13.10 DYSPHAGIA, UNSPECIFIED: ICD-10-CM

## 2022-05-20 DIAGNOSIS — Z28.310 UNVACCINATED FOR COVID-19: ICD-10-CM

## 2022-05-20 DIAGNOSIS — F41.9 ANXIETY DISORDER, UNSPECIFIED: ICD-10-CM

## 2022-05-20 DIAGNOSIS — E87.6 HYPOKALEMIA: ICD-10-CM
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
## 2022-05-20 DIAGNOSIS — J02.9 ACUTE PHARYNGITIS, UNSPECIFIED: ICD-10-CM

## 2022-05-20 DIAGNOSIS — D84.9 IMMUNODEFICIENCY, UNSPECIFIED: ICD-10-CM

## 2022-05-20 DIAGNOSIS — C37 MALIGNANT NEOPLASM OF THYMUS: ICD-10-CM

## 2022-05-25 ENCOUNTER — APPOINTMENT (OUTPATIENT)
Dept: CARDIOLOGY | Facility: CLINIC | Age: 57
End: 2022-05-25

## 2022-08-16 PROBLEM — Q24.8 OTHER SPECIFIED CONGENITAL MALFORMATIONS OF HEART: Chronic | Status: ACTIVE | Noted: 2022-05-13

## 2022-08-16 PROBLEM — Z86.79 PERSONAL HISTORY OF OTHER DISEASES OF THE CIRCULATORY SYSTEM: Chronic | Status: ACTIVE | Noted: 2022-05-13

## 2022-08-16 PROBLEM — E06.3 AUTOIMMUNE THYROIDITIS: Chronic | Status: ACTIVE | Noted: 2022-05-13

## 2022-08-16 PROBLEM — I10 ESSENTIAL (PRIMARY) HYPERTENSION: Chronic | Status: ACTIVE | Noted: 2022-05-13

## 2022-08-16 PROBLEM — I34.1 NONRHEUMATIC MITRAL (VALVE) PROLAPSE: Chronic | Status: ACTIVE | Noted: 2022-05-13

## 2022-08-16 PROBLEM — Z87.898 PERSONAL HISTORY OF OTHER SPECIFIED CONDITIONS: Chronic | Status: ACTIVE | Noted: 2022-05-20

## 2022-08-16 PROBLEM — Z87.898 PERSONAL HISTORY OF OTHER SPECIFIED CONDITIONS: Chronic | Status: ACTIVE | Noted: 2022-05-13

## 2022-08-16 PROBLEM — F41.9 ANXIETY DISORDER, UNSPECIFIED: Chronic | Status: ACTIVE | Noted: 2022-05-13

## 2022-08-25 ENCOUNTER — APPOINTMENT (OUTPATIENT)
Dept: CARDIOLOGY | Facility: CLINIC | Age: 57
End: 2022-08-25

## 2022-08-25 PROCEDURE — 93306 TTE W/DOPPLER COMPLETE: CPT

## 2022-10-06 ENCOUNTER — APPOINTMENT (OUTPATIENT)
Dept: CARDIOLOGY | Facility: CLINIC | Age: 57
End: 2022-10-06

## 2022-10-06 VITALS
RESPIRATION RATE: 14 BRPM | BODY MASS INDEX: 26.29 KG/M2 | WEIGHT: 154 LBS | SYSTOLIC BLOOD PRESSURE: 120 MMHG | HEIGHT: 64 IN | HEART RATE: 79 BPM | DIASTOLIC BLOOD PRESSURE: 80 MMHG

## 2022-10-06 PROCEDURE — 99214 OFFICE O/P EST MOD 30 MIN: CPT | Mod: 25

## 2022-10-06 PROCEDURE — 93000 ELECTROCARDIOGRAM COMPLETE: CPT

## 2022-10-11 NOTE — HISTORY OF PRESENT ILLNESS
[FreeTextEntry1] : From a cardiac standpoint, Mrs. Razo denies exertional chest pain, shortness of breath, but does experience an intermittent fluttering-like sensation which occurs regularly/daily basis.  Her sensation is not associated with lightheadedness or syncope.  \par \par

## 2022-10-11 NOTE — ASSESSMENT
[FreeTextEntry1] : 1.  EKG today reveals normal sinus rhythm at 79 bpm.  Normal intervals.  Poor R-wave progression leads V1 through V3.  Non-specific ST-T changes.  \par \par 2.  “Fluttering:”  Patient with unusual chest sensation since surgery.  Concerned about paroxysmal atrial fibrillation.  Will have patient undergo 24-hour Holter monitoring for further evaluation. \par \par 3.  Large thymoma status-post resection:  Patient with extensive surgery at [facility] on September 15th.  I do not have the surgical records at this time to comment further however, patient does state that not only was the lymphoma removed, but she underwent a complete right lung resection at the time.  She presents today without significant chest pain or shortness of breath.  Pulse oximetry on room air 98%.  Patient will undergo a Holter monitor for further evaluation of her “palpitations.”  Will obtain records from [facility].  Patient to return in six weeks if clinically stable.   \par   \par

## 2022-10-11 NOTE — REASON FOR VISIT
[FreeTextEntry1] : Mrs. Razo is a pleasant 57-year-old white female with a past medical history significant for rheumatic fever as a child as well as the presence of a "pericardial cyst" on CAT scan, and palpitations whom was recently diagnosed with a large mediastinal mass consistent with a thymoma requiring aggressive surgical intervention at [facility] resulting in removal of the mass as well as her right lung.  \par

## 2022-11-07 ENCOUNTER — RESULT CHARGE (OUTPATIENT)
Age: 57
End: 2022-11-07

## 2022-11-08 ENCOUNTER — APPOINTMENT (OUTPATIENT)
Dept: CARDIOLOGY | Facility: CLINIC | Age: 57
End: 2022-11-08

## 2022-11-08 VITALS
RESPIRATION RATE: 14 BRPM | WEIGHT: 152 LBS | DIASTOLIC BLOOD PRESSURE: 76 MMHG | HEIGHT: 64 IN | BODY MASS INDEX: 25.95 KG/M2 | HEART RATE: 68 BPM | SYSTOLIC BLOOD PRESSURE: 120 MMHG

## 2022-11-08 DIAGNOSIS — R42 DIZZINESS AND GIDDINESS: ICD-10-CM

## 2022-11-08 PROCEDURE — 99214 OFFICE O/P EST MOD 30 MIN: CPT | Mod: 25

## 2022-11-08 PROCEDURE — 93000 ELECTROCARDIOGRAM COMPLETE: CPT

## 2022-11-09 NOTE — HISTORY OF PRESENT ILLNESS
[FreeTextEntry1] : From a cardiac standpoint, Mrs. Rubio states that she feels “much better than she did roughly a month ago” when she presented here for follow up.  She denies chest pain and shortness of breath, but does admit to occasional mild lightheadedness when standing up quickly.\par

## 2022-11-09 NOTE — REASON FOR VISIT
[FreeTextEntry1] : Mrs. Razo is a pleasant 57-year-old white female with a past medical history significant for rheumatic fever as well as the presence of a "pericardial cyst" on CAT scan, and palpitations whom was recently diagnosed with a large mediastinal mass consistent with a thymoma requiring aggressive surgical excision at [facility] resulting in removal of the mass as well as her entire right lung.  \par

## 2022-11-09 NOTE — ASSESSMENT
[FreeTextEntry1] : 1.  EKG today reveals normal sinus rhythm at 68 bpm.  Normal intervals.  Poor R-wave progression leads V1 through V3.  No ischemic changes.  \par \par 2.  Status-post resection of thymoma/right pneumonectomy:  Patient clinically improved.  Has been given the option of radiation therapy which she is currently weighing given the side effect of coronary artery disease.  The patient at this time is not likely to proceed with radiation therapy.  A second opinion may be of value.  \par \par 3.  Lightheadedness:  Patient noted to have a blood pressure of 130/80 lying down, 133/81 sitting, and 117/77 standing.  Not exactly orthostatic at this time, however, have advised her on good hydration.  She should also walk as much as possible.  Fasting bloodwork as well as a repeat echo prior to her next office visit in three months.    \par

## 2023-01-26 ENCOUNTER — APPOINTMENT (OUTPATIENT)
Dept: CARDIOLOGY | Facility: CLINIC | Age: 58
End: 2023-01-26
Payer: COMMERCIAL

## 2023-01-26 PROCEDURE — 93306 TTE W/DOPPLER COMPLETE: CPT

## 2023-01-26 RX ADMIN — PERFLUTREN MG/ML: 6.52 INJECTION, SUSPENSION INTRAVENOUS at 00:00

## 2023-02-02 ENCOUNTER — APPOINTMENT (OUTPATIENT)
Dept: CARDIOLOGY | Facility: CLINIC | Age: 58
End: 2023-02-02
Payer: COMMERCIAL

## 2023-02-02 VITALS
OXYGEN SATURATION: 98 % | SYSTOLIC BLOOD PRESSURE: 130 MMHG | BODY MASS INDEX: 25.78 KG/M2 | HEIGHT: 64 IN | HEART RATE: 76 BPM | DIASTOLIC BLOOD PRESSURE: 80 MMHG | RESPIRATION RATE: 14 BRPM | WEIGHT: 151 LBS

## 2023-02-02 DIAGNOSIS — I47.1 SUPRAVENTRICULAR TACHYCARDIA: ICD-10-CM

## 2023-02-02 PROCEDURE — 93000 ELECTROCARDIOGRAM COMPLETE: CPT

## 2023-02-02 PROCEDURE — 99214 OFFICE O/P EST MOD 30 MIN: CPT | Mod: 25

## 2023-02-03 NOTE — ASSESSMENT
[FreeTextEntry1] : 1.  EKG today reveals normal sinus rhythm at 76 bpm.  Normal intervals.  Poor R-wave progression leads V1 through V3.  No ischemic changes.  \par \par 2.  Aggressive resection of mediastinal mass (thymoma):  Patient clinically stable but experiencing some palpitations as well as atypical chest pain from time to time.  She recently underwent echocardiography which revealed normal left ventricular dimensions and wall motion with preserved ejection fraction.  A small pericardial effusion is noted anteriorly and apically without evidence of tamponade.  There is apical septal motion secondary to the post-op state.  Ejection fraction preserved. \par \par 3.  Patient complains of intermittent palpitations and fluttering.  I have advised her to undergo 30-day ambulatory event monitoring for further evaluation.  \par \par 4.  Review of recent bloodwork reveals a total cholesterol of 236, HDL 48, , TC/HDL ratio 4.9, triglycerides 102.  Patient also noted to have a TSH of 1.57.  I have advised patient to correct her relative hypothyroidism and then repeat fasting lipids in order to assess her once again.  Will not begin therapy at this point. \par \par 5.  In terms of her palpitations, patient to wear a 30-day ambulatory event monitor for further evaluation.  If clinically stable, follow up office visit three months.       \par  \par

## 2023-02-03 NOTE — REASON FOR VISIT
[FreeTextEntry1] : Mrs. Razo is a pleasant 57-year-old white female with a past medical history significant for rheumatic fever as well as the presence of a "large pericardial cyst" on CAT scan with subsequent surgery resulting in the resection of a large thymoma performed at [facility].  \par

## 2023-02-03 NOTE — HISTORY OF PRESENT ILLNESS
[FreeTextEntry1] : From a cardiac standpoint, Mrs. Lopez denies exertional chest pain, but does admit to some dyspnea with and without exertion.  Pulse oximetry on room air today is 98%.  Patient also experiencing palpitations intermittently. \par

## 2023-02-15 RX ORDER — PERFLUTREN 6.52 MG/ML
6.52 INJECTION, SUSPENSION INTRAVENOUS
Qty: 2 | Refills: 0 | Status: COMPLETED | OUTPATIENT
Start: 2023-01-26

## 2023-05-01 ENCOUNTER — RESULT CHARGE (OUTPATIENT)
Age: 58
End: 2023-05-01

## 2023-05-02 ENCOUNTER — APPOINTMENT (OUTPATIENT)
Dept: CARDIOLOGY | Facility: CLINIC | Age: 58
End: 2023-05-02
Payer: COMMERCIAL

## 2023-05-02 VITALS
HEART RATE: 62 BPM | HEIGHT: 64 IN | DIASTOLIC BLOOD PRESSURE: 74 MMHG | SYSTOLIC BLOOD PRESSURE: 126 MMHG | RESPIRATION RATE: 14 BRPM | WEIGHT: 164 LBS | BODY MASS INDEX: 28 KG/M2

## 2023-05-02 DIAGNOSIS — R00.2 PALPITATIONS: ICD-10-CM

## 2023-05-02 PROCEDURE — 99214 OFFICE O/P EST MOD 30 MIN: CPT | Mod: 25

## 2023-05-02 PROCEDURE — 93000 ELECTROCARDIOGRAM COMPLETE: CPT

## 2023-05-03 NOTE — REASON FOR VISIT
[FreeTextEntry1] : Mrs. Razo is a pleasant 57-year-old white female with a past medical history significant for rheumatic fever as well as the presence of a "large pericardial cyst" on CAT scan with subsequent surgery resulting in the resection of a large thymoma performed at [facility].  \par

## 2023-05-03 NOTE — ASSESSMENT
[FreeTextEntry1] : \par 1. EKG today reveals normal sinus rhythm at 62 bpm.  Normal intervals.  Low-voltage QRS.  Poor R-wave progression leads V1 through V3.  No ischemic changes.  \par \par 2. Dyspnea on exertion:  Patient status-post right pneumonectomy as part of her thymoma resection last fall.  Have advised her to follow up with pulmonology for a post-op assessment.  Will have her undergo echocardiography as a follow up to her last echo three months ago in which a small pericardial effusion was noted.  \par \par 3. Palpitations/flutters: Markedly improved on Metoprolol therapy.  Patient’s recent 30-day ambulatory event monitor revealed a singular less than 10 second episode of SVT (probable atrial tachycardia).  Have reassured her and advised her to continue beta blocker therapy, possibly at half strength given her recent complaints of fatigue.  CHADS-VASc score is essentially 0, thus no need for anticoagulation therapy.  If palpitations worsen in any way, will ask patient to undergo a follow up consultation with EP.  \par \par 4. Patient advised to undergo fasting bloodwork to assess thyroid function, anemia, and lipid profile.  If clinically stable, office visit two months.    \par

## 2023-05-03 NOTE — HISTORY OF PRESENT ILLNESS
[FreeTextEntry1] : From a cardiac standpoint, Mrs. Lopez presents today with complaints of dyspnea on exertion.  She is status-post right pneumonectomy as part of her thymoma surgery last year.  She also states that her “palpitations/fluttering” has substantially subsided.  Other complaints revolve around fatigue/lack of stamina and occasional coughing fits.  \par

## 2023-06-06 NOTE — H&P ADULT - MLM HIDDEN
Detail Level: Zone Initiate Treatment: doxycycline hyclate 100 mg capsule PO Frequency: QD Sig: Take 1 cap PO QD x 2 weeks with food\\n\\nmetronidazole 0.75 % topical cream- Apply a thin layer to affected areas on face before bed time Moisturize before and/or after applying yes Continue Regimen: Aczone QAM\\ntretinoin (wait until perioral derm cleared)\\nspironolactone 100 mg

## 2023-07-14 ENCOUNTER — APPOINTMENT (OUTPATIENT)
Dept: CARDIOLOGY | Facility: CLINIC | Age: 58
End: 2023-07-14
Payer: COMMERCIAL

## 2023-07-14 PROCEDURE — 93306 TTE W/DOPPLER COMPLETE: CPT

## 2023-07-14 RX ORDER — PERFLUTREN 6.52 MG/ML
6.52 INJECTION, SUSPENSION INTRAVENOUS
Qty: 2 | Refills: 0 | Status: COMPLETED | OUTPATIENT
Start: 2023-07-14

## 2023-07-14 RX ADMIN — PERFLUTREN MG/ML: 6.52 INJECTION, SUSPENSION INTRAVENOUS at 00:00

## 2023-07-20 ENCOUNTER — TRANSCRIPTION ENCOUNTER (OUTPATIENT)
Age: 58
End: 2023-07-20

## 2023-08-01 ENCOUNTER — APPOINTMENT (OUTPATIENT)
Dept: CARDIOLOGY | Facility: CLINIC | Age: 58
End: 2023-08-01
Payer: COMMERCIAL

## 2023-08-01 VITALS
SYSTOLIC BLOOD PRESSURE: 110 MMHG | OXYGEN SATURATION: 98 % | DIASTOLIC BLOOD PRESSURE: 78 MMHG | WEIGHT: 160 LBS | BODY MASS INDEX: 27.31 KG/M2 | HEART RATE: 71 BPM | HEIGHT: 64 IN

## 2023-08-01 DIAGNOSIS — R07.9 CHEST PAIN, UNSPECIFIED: ICD-10-CM

## 2023-08-01 DIAGNOSIS — Q24.8 OTHER SPECIFIED CONGENITAL MALFORMATIONS OF HEART: ICD-10-CM

## 2023-08-01 DIAGNOSIS — Z90.2 ACQUIRED ABSENCE OF LUNG [PART OF]: ICD-10-CM

## 2023-08-01 DIAGNOSIS — D49.89 NEOPLASM OF UNSPECIFIED BEHAVIOR OF OTHER SPECIFIED SITES: ICD-10-CM

## 2023-08-01 DIAGNOSIS — R09.89 OTHER SPECIFIED SYMPTOMS AND SIGNS INVOLVING THE CIRCULATORY AND RESPIRATORY SYSTEMS: ICD-10-CM

## 2023-08-01 DIAGNOSIS — R06.09 OTHER FORMS OF DYSPNEA: ICD-10-CM

## 2023-08-01 PROCEDURE — 93000 ELECTROCARDIOGRAM COMPLETE: CPT

## 2023-08-01 PROCEDURE — 99214 OFFICE O/P EST MOD 30 MIN: CPT | Mod: 25

## 2023-08-01 NOTE — PHYSICAL EXAM
[Carotid Bruit] : carotid bruit [Clear Lung Fields] : clear lung fields [Normal Bowel Sounds] : normal bowel sounds [No Edema] : no edema [No Rash] : no rash [Normal] : alert and oriented, normal memory

## 2023-08-02 NOTE — ASSESSMENT
[FreeTextEntry1] : 1. EKG today reveals normal sinus rhythm at 68 bpm.  Normal intervals.  No evidence of ischemia.   2. Thymoma status-post resection: Patient clinically stable following surgery. Did undergo right pneumonectomy as part of her surgery and has remained with reasonable fatigue. Recent echocardiography revealed normal left ventricular chamber dimensions and wall motion with preserved ejection fraction estimated between 60 and 65%. The left atrium and right-sided chambers revealed normal dimensions and function. Trace mitral regurgitation and trace tricuspid regurgitation were noted. Pulmonary artery pressures within normal limits. No other significant findings.   3. I have recommended the patient undergo a cardiac CTA for further evaluation of coronary circulation.   4. Soft right carotid bruit: Patient will undergo carotid duplex to rule out peripheral artery disease.   5. Review of recent bloodwork demonstrates a total cholesterol of 194, HDL 55, TC/HDL ratio 3.5, , triglycerides 95. Patient advised to continue a low-fat / low-cholesterol diet and exercise on a regular basis. If clinically stable, follow up office visit six months.

## 2023-08-02 NOTE — HISTORY OF PRESENT ILLNESS
[FreeTextEntry1] : From a cardiac standpoint, Mrs. Lopez denies exertional chest pain or significant shortness of breath but does continue to experience fatigue which she believes is likely secondary to her pneumonectomy.

## 2023-08-02 NOTE — REASON FOR VISIT
[FreeTextEntry1] : Mrs. Razo is a pleasant 57-year-old white female with a past medical history significant for rheumatic fever as well as the resection of a large thymoma requiring right pneumonectomy performed in 2022 at [facility], who presents for follow-up evaluation.

## 2023-12-26 RX ORDER — METOPROLOL SUCCINATE 25 MG/1
25 TABLET, EXTENDED RELEASE ORAL DAILY
Qty: 90 | Refills: 2 | Status: ACTIVE | COMMUNITY
Start: 2021-08-25 | End: 1900-01-01

## 2024-09-20 ENCOUNTER — OFFICE (OUTPATIENT)
Facility: LOCATION | Age: 59
Setting detail: OPHTHALMOLOGY
End: 2024-09-20
Payer: COMMERCIAL

## 2024-09-20 DIAGNOSIS — H53.2: ICD-10-CM

## 2024-09-20 DIAGNOSIS — H49.13: ICD-10-CM

## 2024-09-20 PROCEDURE — 92083 EXTENDED VISUAL FIELD XM: CPT | Performed by: OPHTHALMOLOGY

## 2024-09-20 PROCEDURE — 99214 OFFICE O/P EST MOD 30 MIN: CPT | Performed by: OPHTHALMOLOGY

## 2024-09-20 ASSESSMENT — CONFRONTATIONAL VISUAL FIELD TEST (CVF)
OD_FINDINGS: FULL
OS_FINDINGS: FULL

## 2025-06-05 ENCOUNTER — OFFICE (OUTPATIENT)
Dept: URBAN - METROPOLITAN AREA CLINIC 6 | Facility: CLINIC | Age: 60
Setting detail: OPHTHALMOLOGY
End: 2025-06-05
Payer: COMMERCIAL

## 2025-06-05 DIAGNOSIS — H25.13: ICD-10-CM

## 2025-06-05 DIAGNOSIS — H53.2: ICD-10-CM

## 2025-06-05 DIAGNOSIS — H52.7: ICD-10-CM

## 2025-06-05 DIAGNOSIS — H50.21: ICD-10-CM

## 2025-06-05 PROCEDURE — 92060 SENSORIMOTOR EXAMINATION: CPT | Performed by: OPHTHALMOLOGY

## 2025-06-05 PROCEDURE — 92015 DETERMINE REFRACTIVE STATE: CPT | Performed by: OPHTHALMOLOGY

## 2025-06-05 PROCEDURE — 99214 OFFICE O/P EST MOD 30 MIN: CPT | Performed by: OPHTHALMOLOGY

## 2025-06-05 ASSESSMENT — REFRACTION_CURRENTRX
OD_AXIS: 090
OS_ADD: +2.00
OS_OVR_VA: 20/
OS_SPHERE: +2.00
OS_AXIS: 060
OS_VPRISM_DIRECTION: PROGS
OD_OVR_VA: 20/
OD_ADD: +2.00
OD_CYLINDER: -0.25
OD_SPHERE: +2.00
OD_VPRISM_DIRECTION: PROGS
OS_CYLINDER: -0.25

## 2025-06-05 ASSESSMENT — REFRACTION_MANIFEST
OD_CYLINDER: -0.50
OS_VPRISM: BU
OS_SPHERE: +2.00
OS_CYLINDER: -0.50
OS_ADD: +2.00
OD_VPRISM: BD
OD_ADD: +2.00
OS_VA1: 20/20
OD_SPHERE: +2.25
OD_VA1: 20/30
OU_VA: 20/20
OD_AXIS: 080
OS_AXIS: 105

## 2025-06-05 ASSESSMENT — KERATOMETRY
OD_K2POWER_DIOPTERS: 43.75
METHOD_AUTO_MANUAL: AUTO
OS_K2POWER_DIOPTERS: 43.75
OD_K1POWER_DIOPTERS: 43.50
OD_AXISANGLE_DEGREES: 094
OS_AXISANGLE_DEGREES: 132
OS_K1POWER_DIOPTERS: 43.25

## 2025-06-05 ASSESSMENT — REFRACTION_AUTOREFRACTION
OS_CYLINDER: -0.50
OS_SPHERE: +2.00
OD_CYLINDER: -0.50
OS_AXIS: 103
OD_SPHERE: +2.25
OD_AXIS: 078

## 2025-06-05 ASSESSMENT — TONOMETRY
OD_IOP_MMHG: 14
OS_IOP_MMHG: 14

## 2025-06-05 ASSESSMENT — CONFRONTATIONAL VISUAL FIELD TEST (CVF)
OS_FINDINGS: FULL
OD_FINDINGS: FULL

## 2025-06-05 ASSESSMENT — VISUAL ACUITY
OS_BCVA: 20/25-2
OD_BCVA: 20/20